# Patient Record
Sex: FEMALE | Race: BLACK OR AFRICAN AMERICAN | URBAN - METROPOLITAN AREA
[De-identification: names, ages, dates, MRNs, and addresses within clinical notes are randomized per-mention and may not be internally consistent; named-entity substitution may affect disease eponyms.]

---

## 2017-01-10 ENCOUNTER — TELEPHONE (OUTPATIENT)
Dept: FAMILY MEDICINE | Facility: CLINIC | Age: 52
End: 2017-01-10

## 2017-01-10 NOTE — TELEPHONE ENCOUNTER
PRIOR AUTHORIZATION FOR   Drug: Tretinoin 0.1% Cream  Insurance: [facility]  ID Number: [id number]  BIN Number: [id number]  N Number: [id number]  Group Number: [id number]  Phone Number: [phone]    Please notify pharmacy if Prior Auth is approved or denied        Thank You  Erika Bhatt CPhT, [place] Pharmacy Technician  [phone]  [email or web address]

## 2017-01-16 NOTE — TELEPHONE ENCOUNTER
PA for tretinoin 0.1% cream denied on 12/1/2016. Still within the appeal period so unable to start a new PA. Please see PA telephone encounter from 7/21/2016 for more information. Message routed to PCP for review.    Abram Amaro, CMA

## 2017-02-14 DIAGNOSIS — J18.9 CAP (COMMUNITY ACQUIRED PNEUMONIA): Primary | ICD-10-CM

## 2017-02-14 DIAGNOSIS — Z00.00 HEALTH CARE MAINTENANCE: ICD-10-CM

## 2017-02-14 RX ORDER — AZITHROMYCIN 250 MG/1
TABLET, FILM COATED ORAL
Qty: 6 TABLET | Refills: 0 | Status: SHIPPED | OUTPATIENT
Start: 2017-02-14 | End: 2017-07-06

## 2017-02-14 RX ORDER — ACETAMINOPHEN 160 MG
1 TABLET,DISINTEGRATING ORAL DAILY
Qty: 30 CAPSULE | Refills: 11 | Status: SHIPPED | OUTPATIENT
Start: 2017-02-14 | End: 2017-09-19

## 2017-02-24 DIAGNOSIS — J01.90 ACUTE SINUSITIS WITH SYMPTOMS > 10 DAYS: Primary | ICD-10-CM

## 2017-06-27 DIAGNOSIS — K21.9 GASTROESOPHAGEAL REFLUX DISEASE WITHOUT ESOPHAGITIS: ICD-10-CM

## 2017-06-27 NOTE — TELEPHONE ENCOUNTER
Omeprazole 20mg Capsules       Last Written Prescription Date: 04/27/16  Last Fill Quantity: 90,  # refills: 3  Last Office Visit with [facility], P or [facility] prescribing provider: 08/18/16    Thank You  Erika Bhatt CPhT, [place] Pharmacy Technician  [phone]  [email or web address]

## 2017-06-28 ENCOUNTER — TELEPHONE (OUTPATIENT)
Dept: FAMILY MEDICINE | Facility: CLINIC | Age: 52
End: 2017-06-28

## 2017-06-28 RX ORDER — NICOTINE POLACRILEX 4 MG/1
20 GUM, CHEWING ORAL DAILY
Qty: 90 TABLET | Refills: 3 | Status: SHIPPED | OUTPATIENT
Start: 2017-06-28 | End: 2017-09-19

## 2017-06-28 NOTE — TELEPHONE ENCOUNTER
PRIOR AUTHORIZATION FOR   Drug: Tretinoin 0.1% Cream  Insurance: [facility]  ID Number: [id number]  BIN Number: [id number]  N Number: [facility]  Group Number: [facility]  Phone Number: [phone]    Please notify pharmacy if Prior Auth is approved or denied          Thank You  Erika Bhatt CPhT, [place] Pharmacy Technician  [phone]  [email or web address]

## 2017-07-06 ENCOUNTER — OFFICE VISIT (OUTPATIENT)
Dept: FAMILY MEDICINE | Facility: CLINIC | Age: 52
End: 2017-07-06

## 2017-07-06 VITALS
DIASTOLIC BLOOD PRESSURE: 81 MMHG | HEART RATE: 79 BPM | TEMPERATURE: 98.6 F | RESPIRATION RATE: 16 BRPM | SYSTOLIC BLOOD PRESSURE: 116 MMHG | OXYGEN SATURATION: 96 %

## 2017-07-06 DIAGNOSIS — Z11.1 TUBERCULOSIS SCREENING: Primary | ICD-10-CM

## 2017-07-06 ASSESSMENT — ENCOUNTER SYMPTOMS
NEUROLOGICAL NEGATIVE: 1
CONSTITUTIONAL NEGATIVE: 1
RESPIRATORY NEGATIVE: 1
CARDIOVASCULAR NEGATIVE: 1

## 2017-07-06 NOTE — PATIENT INSTRUCTIONS
Here is the plan from today's visit    1. Tuberculosis screening    - XR CHEST 1 VW (+PPD OR PREOP); Future          Thank you for coming to [facility] today.  Lab Testing:  **If you had lab testing today and your results are reassuring or normal they will be mailed to you or sent through [employer] within 7 days.   **If the lab tests need quick action we will call you with the results.  The phone number we will call with results is # [phone] (home) . If this is not the best number please call our clinic and change the number.  Medication Refills:  If you need any refills please call your pharmacy and they will contact us.   If you need to  your refill at a new pharmacy, please contact the new pharmacy directly. The new pharmacy will help you get your medications transferred faster.   Scheduling:  If you have any concerns about today's visit or wish to schedule another appointment please call our office during normal business hours [phone] (8-5:00 M-F)  If a referral was made to a [facility] and you don't get a call from central scheduling please call [phone].  If a Mammogram was ordered for you at [facility] call [phone] to schedule or change your appointment.  If you had an XRay/CT/Ultrasound/MRI ordered the number is [phone] to schedule or change your radiology appointment.   Medical Concerns:  If you have urgent medical concerns please call [phone] at any time of the day.  If you have a medical emergency please call [phone].

## 2017-07-06 NOTE — PROGRESS NOTES
HPI:       Sakina Banuelos is a 52 year old who presents for the following  Patient presents with:  Radiology Visit: needs chest x-ray for work- Screening for TB    Patient presents today for chest x-ray that is required for her employment.     Patient reports history of positive PPD, latent tuberculosis.  She reports completing 9 months of [place] for [place] in [place] [facility]  Her employer is requiring an additional chest x-ray for screening.         Problem, Medication and Allergy Lists were reviewed and are current.  Patient is an established patient of this clinic.         Review of Systems:   Review of Systems   Constitutional: Negative.    Respiratory: Negative.    Cardiovascular: Negative.    Neurological: Negative.              Physical Exam:   Patient Vitals for the past 24 hrs:   BP Temp Temp src Pulse Resp SpO2 Weight   07/06/17 0828 116/81 98.6  F (37  C) Oral 79 16 96 % -     There is no height or weight on file to calculate BMI.  Vitals were reviewed and were normal     Physical Exam   Constitutional: She is oriented to person, place, and time. She appears well-nourished. No distress.   Cardiovascular: Normal rate and regular rhythm.    No murmur heard.  Pulmonary/Chest: Effort normal and breath sounds normal. No respiratory distress. She has no wheezes. She has no rales.   Neurological: She is alert and oriented to person, place, and time.   Psychiatric: She has a normal mood and affect.       Assessment and Plan     Sakina was seen today for radiology visit.    Diagnoses and all orders for this visit:    Tuberculosis screening  History of latent tuberculosis - completed 9 month INH regimen in 2002  -     XR CHEST 1 VW (+PPD OR PREOP); Future  -     Will notify patient of official chest xray results once available.      Follow-up here in clinic as needed.        Medications Discontinued During This Encounter   Medication Reason     amoxicillin-clavulanate (AUGMENTIN) 875-125 MG per  tablet      azithromycin (ZITHROMAX) 250 MG tablet      cholecalciferol (VITAMIN D3) 36561 UNITS capsule      cholecalciferol (VITAMIN D) 1000 UNIT tablet      chloroquine (ARALEN) 500 MG tablet      ferrous gluconate (FERGON) 324 (38 FE) MG tablet      loperamide (IMODIUM A-D) 2 MG tablet      triamcinolone (KENALOG) 0.1 % ointment      vitamin D (ERGOCALCIFEROL) 11967 UNIT capsule      loratadine (CLARITIN) 10 MG tablet      Options for treatment and follow-up care were reviewed with the patient. Sakina Banuelos  engaged in the decision making process and verbalized understanding of the options discussed and agreed with the final plan.    Chantel Monroe, KANU CNP

## 2017-07-06 NOTE — MR AVS SNAPSHOT
After Visit Summary   7/6/2017    Sakina Banuelos    MRN: [id number]           Patient Information     Date Of Birth          1965        Visit Information        Provider Department      7/6/2017 8:20 AM Chantel Monroe APRN CNP \[facility]\"" [facility]        Today's Diagnoses     Tuberculosis screening    -  1      Care Instructions    Here is the plan from today's visit    1. Tuberculosis screening    - XR CHEST 1 VW (+PPD OR PREOP); Future          Thank you for coming to [facility] today.  Lab Testing:  **If you had lab testing today and your results are reassuring or normal they will be mailed to you or sent through [employer] within 7 days.   **If the lab tests need quick action we will call you with the results.  The phone number we will call with results is # [phone] (home) . If this is not the best number please call our clinic and change the number.  Medication Refills:  If you need any refills please call your pharmacy and they will contact us.   If you need to  your refill at a new pharmacy, please contact the new pharmacy directly. The new pharmacy will help you get your medications transferred faster.   Scheduling:  If you have any concerns about today's visit or wish to schedule another appointment please call our office during normal business hours [phone] (8-5:00 M-F)  If a referral was made to a [facility] and you don't get a call from central scheduling please call [phone].  If a Mammogram was ordered for you at [facility] call [phone] to schedule or change your appointment.  If you had an XRay/CT/Ultrasound/MRI ordered the number is [phone] to schedule or change your radiology appointment.   Medical Concerns:  If you have urgent medical concerns please call [phone] at any time of the day.  If you have a medical emergency please call [phone].            Follow-ups after your visit        Future tests  that were ordered for you today     Open Future Orders        Priority Expected Expires Ordered    XR CHEST 1 VW (+PPD OR PREOP) Routine 2017            Who to contact     Please call your clinic at [phone] to:    Ask questions about your health    Make or cancel appointments    Discuss your medicines    Learn about your test results    Speak to your doctor   If you have compliments or concerns about an experience at your clinic, or if you wish to file a complaint, please contact [facility] Patient Relations at [phone] or email us at [email or web address]         Additional Information About Your Visit        [employer] Information     [employer] is an electronic gateway that provides easy, online access to your medical records. With [employer], you can request a clinic appointment, read your test results, renew a prescription or communicate with your care team.     To sign up for [employer] visit the website at [email or web address]   You will be asked to enter the access code listed below, as well as some personal information. Please follow the directions to create your username and password.     Your access code is: [email or web address]  Expires: 10/4/2017  8:55 AM     Your access code will  in 90 days. If you need help or a new code, please contact your [facility] or call [phone] for assistance.        Care EveryWhere ID     This is your Care EveryWhere ID. This could be used by other organizations to access your [facility] medical records  [id number]        Your Vitals Were     Pulse Temperature Respirations Last Period Pulse Oximetry       79 98.6  F (37  C) (Oral) 16 2017 96%        Blood Pressure from Last 3 Encounters:   17 116/81   16 119/86   16 128/64    Weight from Last 3 Encounters:   13 231 lb (104.8 kg)   13 229 lb 1.6 oz (103.9 kg)   13 227 lb (103 kg)                  Today's Medication Changes          These changes are accurate as of: 7/6/17  8:55 AM.  If you have any questions, ask your nurse or doctor.               These medicines have changed or have updated prescriptions.        Dose/Directions    vitamin D3 2000 UNITS Caps   This may have changed:  Another medication with the same name was removed. Continue taking this medication, and follow the directions you see here.   Used for:  Health care maintenance   Changed by:  Dariana Willingham MD        Dose:  1 tablet   Take 1 tablet by mouth daily   Quantity:  30 capsule   Refills:  11         Stop taking these medicines if you haven't already. Please contact your care team if you have questions.     amoxicillin-clavulanate 875-125 MG per tablet   Commonly known as:  AUGMENTIN   Stopped by:  Chantel Monroe APRN CNP           azithromycin 250 MG tablet   Commonly known as:  ZITHROMAX   Stopped by:  Chantel Monroe APRN CNP           chloroquine 500 MG tablet   Commonly known as:  ARALEN   Stopped by:  Chantel Monroe APRN CNP           ferrous gluconate 324 (38 FE) MG tablet   Commonly known as:  FERGON   Stopped by:  Chantel Monroe APRN CNP           loperamide 2 MG tablet   Commonly known as:  IMODIUM A-D   Stopped by:  Chantel Monroe APRN CNP           loratadine 10 MG tablet   Commonly known as:  CLARITIN   Stopped by:  Chantel Monroe APRN CNP           triamcinolone 0.1 % ointment   Commonly known as:  KENALOG   Stopped by:  Chantel Monroe APRN CNP           vitamin D 31366 UNIT capsule   Commonly known as:  ERGOCALCIFEROL   Stopped by:  Chantel Monroe APRN CNP                    Primary Care Provider Office Phone # Fax #    Francois Prescott [phone]       [address] [address]        Equal Access to Services     CECILE MCKEON AH: britta Sanders qaybta kaalmada  romaine pruittfernanda croftaaaziza ah. [facility] [phone].    ATENCIÓN: Si kirtila shena, tiene a cortez disposición servicios gratuitos de asistencia lingüística. Jennifer al [phone].    We comply with applicable federal civil rights laws and [place] laws. We do not discriminate on the basis of race, color, national origin, age, disability sex, sexual orientation or gender identity.            Thank you!     Thank you for choosing [facility]  for your care. Our goal is always to provide you with excellent care. Hearing back from our patients is one way we can continue to improve our services. Please take a few minutes to complete the written survey that you may receive in the mail after your visit with us. Thank you!             Your Updated Medication List - Protect others around you: Learn how to safely use, store and throw away your medicines at www.disposemymeds.org.          This list is accurate as of: 7/6/17  8:55 AM.  Always use your most recent med list.                   Brand Name Dispense Instructions for use Diagnosis    albuterol 108 (90 BASE) MCG/ACT Inhaler    albuterol    2 Inhaler    Inhale 1-2 puffs into the lungs every 4 hours as needed for shortness of breath / dyspnea Every 4-6 hours as needed.    History of wheezing       hydroquinone 4 % Crea     56.8 g    Externally apply topically daily as needed    Acne scarring, Hyperpigmentation, Acne vulgaris       omeprazole 20 MG tablet     90 tablet    Take 1 tablet (20 mg) by mouth daily Take 30-60 minutes before a meal.    Gastroesophageal reflux disease without esophagitis       PRENATAL MULTI +DHA 27-0.8-228 MG Caps     120 capsule    Take 1 tablet by mouth 2 times daily    Routine general medical examination at a health care facility       tretinoin 0.1 % cream    RETIN-A    45 g    Apply topically At Bedtime    Acne vulgaris       vitamin D3 2000 UNITS Caps     30 capsule    Take 1 tablet by mouth daily     Health care maintenance

## 2017-09-19 ENCOUNTER — OFFICE VISIT (OUTPATIENT)
Dept: FAMILY MEDICINE | Facility: CLINIC | Age: 52
End: 2017-09-19

## 2017-09-19 VITALS
HEART RATE: 82 BPM | OXYGEN SATURATION: 98 % | RESPIRATION RATE: 16 BRPM | DIASTOLIC BLOOD PRESSURE: 83 MMHG | TEMPERATURE: 98.1 F | SYSTOLIC BLOOD PRESSURE: 122 MMHG | HEIGHT: 65 IN

## 2017-09-19 DIAGNOSIS — M54.5 ACUTE BILATERAL LOW BACK PAIN, WITH SCIATICA PRESENCE UNSPECIFIED: Primary | ICD-10-CM

## 2017-09-19 DIAGNOSIS — K21.9 GASTROESOPHAGEAL REFLUX DISEASE WITHOUT ESOPHAGITIS: ICD-10-CM

## 2017-09-19 DIAGNOSIS — Z00.00 ROUTINE GENERAL MEDICAL EXAMINATION AT A HEALTH CARE FACILITY: ICD-10-CM

## 2017-09-19 DIAGNOSIS — Z00.00 HEALTH CARE MAINTENANCE: ICD-10-CM

## 2017-09-19 DIAGNOSIS — S29.012A RHOMBOID MUSCLE STRAIN, INITIAL ENCOUNTER: ICD-10-CM

## 2017-09-19 LAB
BILIRUBIN UR: NEGATIVE
BLOOD UR: NEGATIVE
GLUCOSE URINE: NEGATIVE
KETONES UR QL: NEGATIVE
LEUKOCYTE ESTERASE UR: NEGATIVE
NITRITE UR QL STRIP: NEGATIVE
PH UR STRIP: 5 [PH] (ref 5–7)
PROTEIN UR: NEGATIVE
SP GR UR STRIP: 1.02
UROBILINOGEN UR STRIP-ACNC: 0.2

## 2017-09-19 RX ORDER — ACETAMINOPHEN 160 MG
1 TABLET,DISINTEGRATING ORAL DAILY
Qty: 30 CAPSULE | Refills: 11 | Status: SHIPPED | OUTPATIENT
Start: 2017-09-19 | End: 2018-07-31

## 2017-09-19 RX ORDER — CHOLECALCIFEROL (VITAMIN D3) 25 MCG
1 TABLET,CHEWABLE ORAL 2 TIMES DAILY
Qty: 120 CAPSULE | Refills: 11 | Status: SHIPPED | OUTPATIENT
Start: 2017-09-19 | End: 2018-03-08

## 2017-09-19 RX ORDER — NICOTINE POLACRILEX 4 MG/1
20 GUM, CHEWING ORAL DAILY
Qty: 90 TABLET | Refills: 3 | Status: SHIPPED | OUTPATIENT
Start: 2017-09-19 | End: 2018-10-26

## 2017-09-19 NOTE — PROGRESS NOTES
FELICIA Presley is a 52 year old  female  who presents:    Chief Complaint   Patient presents with     Pain     Lower stomache pain X1day     1 week of bilateral back pain that radiates into her anterior abdomen. Has more burping. Does not feel bloated.  Not sure if this is muscle.  Has been trying to stretch it but not much better.      ROS: no burning, but when she completes urination it feels a bit odd.  If she drinks more water she has to run more, has trouble holding her urine. At night she is without nocturia, except for if she drinks at [place].  No fevers chills, no vomiting.  Right now she is not constipated - goes daily.    Occasoinally with epigastric pain -  Takes her omeprazole every day in the am - is helping her epigastric pain.      Has ongoing back pain with left sciatic pain.      Also when she sleeps her back hurts under her shoulders.         FHx: mother has DM and she is worried she might have them.      SHx: had surgery to remove a fibroid and since then has had much less bleeding.     Patient Active Problem List   Diagnosis     Health Care Home     Vitamin D deficiency     Screening for malignant neoplasm of cervix     Dyspareunia     Irregular menstrual cycle     Obesity     Vaginal leukorrhea     PCOS (polycystic ovarian syndrome)     S/P myomectomy       Current Outpatient Prescriptions   Medication Sig Dispense Refill     omeprazole 20 MG tablet Take 1 tablet (20 mg) by mouth daily Take 30-60 minutes before a meal. 90 tablet 3     Cholecalciferol (VITAMIN D3) 2000 UNITS CAPS Take 1 tablet by mouth daily 30 capsule 11     albuterol (ALBUTEROL) 108 (90 BASE) MCG/ACT inhaler Inhale 1-2 puffs into the lungs every 4 hours as needed for shortness of breath / dyspnea Every 4-6 hours as needed. 2 Inhaler 4     Prenatal Vit-Fe Fum-FA-Omega (PRENATAL MULTI +DHA) 27-0.8-228 MG CAPS Take 1 tablet by mouth 2 times daily 120 capsule 11     tretinoin (RETIN-A) 0.1 % cream Apply topically At Bedtime 45  "g 3     hydroquinone 4 % CREA Externally apply topically daily as needed 56.8 g 3        No Known Allergies     Results from the last 24 hours  Results for orders placed or performed in visit on 09/19/17 (from the past 24 hour(s))   Urinalysis, Micro If (UA) ([facility])   Result Value Ref Range    Specific Gravity Urine 1.025 1.005 - 1.030    pH Urine 5.0 4.5 - 8.0    Leukocyte Esterase UR negative NEGATIVE    Nitrite Urine negative NEGATIVE    Protein UR negative NEGATIVE    Glucose Urine negative NEGATIVE    Ketones Urine negative NEGATIVE    Urobilinogen mg/dL 0.2 (A) 0.2 E.U./dL    Bilirubin UR Negative NEGATIVE    Blood UR Negative NEGATIVE            Review of Systems:               Physical Exam:     Vitals:    09/19/17 1556   BP: 122/83   Pulse: 82   Resp: 16   Temp: 98.1  F (36.7  C)   TempSrc: Oral   SpO2: 98%   Height: 5' 4.8\" (164.6 cm)     There is no height or weight on file to calculate BMI.  Vitals were reviewed and were normal  GENERAL: healthy, alert, well nourished, well hydrated, no distress  ABDOMEN: soft, no tenderness, no  hepatosplenomegaly, no masses, normal bowel sounds  BACK: bilateral tenderness over SI joints, + Fabere bilaterally  Negative SLR on the left  Nl dorsiflexion strength  Tender over rhomboids bilaterally, left worse than right     Office Visit on 08/18/2016   Component Date Value Ref Range Status     PAP 08/18/2016 NIL   Final     Copath Report 08/18/2016    Final                    Value:  Acc#: [id number]   Signed: 8/22/2016 13:38   MR#: [id number]    SPECIMEN/STAIN PROCESS:  Pap imaged thin layer prep screening (Surepath, FocalPoint with guided  screening)       Pap-Cyto x 1, Reflex HPV if NIL/ASCUS/LSIL x 1    SOURCE: Cervical, endocervical  ----------------------------------------------------------------   Pap imaged thin layer prep screening (Surepath, FocalPoint with guided  screening)  SPECIMEN ADEQUACY:  Satisfactory for evaluation.  -Transformation zone component " present.    CYTOLOGIC INTERPRETATION:    Negative for Intraepithelial Lesion or Malignancy    Electronically signed by:  FLORENCE Rios (ASCP)    CLINICAL HISTORY:    Previous normal pap  Date of Last Pap: 7/22/10,  Papanicolaou Test Limitations:  Cervical cytology is a screening test  with limited sensitivity; regular screening is critical for cancer  prevention; Pap tests are primarily effective for the  diagnosis/prevention of squamous cell carcinoma, not adenocarcinomas or  other cancers.  TESTING                           LAB LOCATION:  [facility], [address] [address], [phone]  Processed and screened at [facility],  [facility]       Hemoglobin A1C 08/18/2016 5.1  4.3 - 6.0 % Final     Cholesterol 08/18/2016 173.9  0.0 - 200.0 mg/dL Final     Cholesterol/HDL Ratio 08/18/2016 2.9  0.0 - 5.0 Final     HDL Cholesterol 08/18/2016 60.5  >40.0 mg/dL Final     LDL Cholesterol Calculated 08/18/2016 104  0 - 129 mg/dL Final     Triglycerides 08/18/2016 46.2  0.0 - 150.0 mg/dL Final     VLDL Cholesterol 08/18/2016 9.2  7.0 - 32.0 mg/dL Final     Vitamin D Deficiency screening 08/18/2016 33  20 - 75 ug/L Final    Comment: Season, race, dietary intake, and treatment affect the concentration of   25-hydroxy-Vitamin D. Values may decrease during winter months and increase   during summer months. Values 20-29 ug/L may indicate Vitamin D insufficiency   and values <20 ug/L may indicate Vitamin D deficiency.   Vitamin D determination is routinely performed by an immunoassay specific for   25 hydroxyvitamin D3.  If an individual is on vitamin D2 (ergocalciferol)   supplementation, please specify 25 OH vitamin D2 and D3 level determination   by   LCMSMS test VITD23.       Specimen Descrip 08/18/2016 Cervical   Final     N Gonorrhea PCR 08/18/2016   NEG Final                    Value:Negative   Negative for N.  gonorrhoeae rRNA by transcription mediated amplification.   A negative result by transcription mediated amplification does not preclude the   presence of N. gonorrhoeae infection because results are dependent on proper   and adequate collection, absence of inhibitors, and sufficient rRNA to be   detected.       Specimen Description 08/18/2016 Cervical   Final     Chlamydia Trachomatis PCR 08/18/2016   NEG Final                    Value:Negative   Negative for C. trachomatis rRNA by transcription mediated amplification.   A negative result by transcription mediated amplification does not preclude the   presence of C. trachomatis infection because results are dependent on proper   and adequate collection, absence of inhibitors, and sufficient rRNA to be   detected.       Yeast Wet Prep 08/18/2016 None   Final     Motile Trichomonas Wet Prep 08/18/2016 Negative   Final     Clue Cells Wet Prep 08/18/2016 None   Final     WBC WET PREP 08/18/2016 None  2 - 5 Final     Bacteria Wet Prep 08/18/2016 Few   Final     pH Wet Prep 08/18/2016 <4.5   Final     Odor Wet Prep 08/18/2016 None   Final     HIV Antigen Antibody Combo 08/18/2016   NR Final                    Value:Nonreactive   HIV-1 p24 Ag & HIV-1/HIV-2 Ab Not Detected       Prolactin 08/18/2016 14  3 - 27 ug/L Final    Reference ranges apply to non-pregnant females only.     TSH 08/18/2016 1.74  0.40 - 4.00 mU/L Final     FSH 08/18/2016 5.0  IU/L Final    Comment: FSH Reference Range   Female: Follicular      2.5-10.2           Mid-cycle       3.4-33.4           Luteal          1.5-9.1           Postmenopausal  23.0-116.3       Dehydroepiandrosterone 08/18/2016 1.650   Final    Comment: Reference range: 0.630 to 4.700  Unit: ng/mL  (Note)  INTERPRETIVE INFORMATION: Dehydroepiandrosterone, Females  18 years and older:   Postmenopausal: 0.60-5.73 ng/mL  REFERENCE INTERVAL: Dehydroepiandrosterone by TMS  Access complete set of age- and/or gender-specific  reference  intervals for this test in the [facility]  Test Directory ([employer]).  Test developed and characteristics determined by [facility]. See Compliance Statement B: [employer]/CS  Performed by [facility],  [address] [facility] [place] [facility] [address] [phone]  www.[employer], Greg Silva MD, Lab. Director       Testosterone Total 08/18/2016 55  8 - 60 ng/dL Final    Comment: This test was developed and its performance characteristics determined by the   [facility],  Special [facility] Laboratory. It has   not been cleared or approved by the FDA. The laboratory is regulated under   CLIA   as qualified to perform high-complexity testing. This test is used for   clinical   purposes. It should not be regarded as investigational or for research.       Sex Hormone Binding Globulin 08/18/2016 95  30 - 135 nmol/L Final     Free Testosterone Calculated 08/18/2016 0.45  0.11 - 0.58 ng/dL Final    Comment: 18-30 Years 0.08-0.74 ng/dL   31-40 Years 0.13-0.92 ng/dL   41-51 Years 0.11-0.58 ng/dL   Postmenopausal 0.06-0.38 ng/dL       Lutropin 08/18/2016 15.9  IU/L Final    Comment: LH Reference Range   Female: Follicular      1.9-12.5           Mid-cycle       8.7-76.3           Luteal          0.5-16.9           Postmenopausal  15.9-54.0       WBC 08/18/2016 4.0  4.0 - 11.0 10e9/L Final     RBC Count 08/18/2016 4.41  3.8 - 5.2 10e12/L Final     Hemoglobin 08/18/2016 9.0* 11.7 - 15.7 g/dL Final     Hematocrit 08/18/2016 31.4* 35.0 - 47.0 % Final     MCV 08/18/2016 71* 78 - 100 fl Final     MCH 08/18/2016 20.4* 26.5 - 33.0 pg Final     MCHC 08/18/2016 28.7* 31.5 - 36.5 g/dL Final     RDW 08/18/2016 21.0* 10.0 - 15.0 % Final     Platelet Count 08/18/2016 203  150 - 450 10e9/L Final     Diff Method 08/18/2016 Automated Method   Final     % Neutrophils 08/18/2016 44.9  % Final     % Lymphocytes 08/18/2016 42.7  % Final     % Monocytes 08/18/2016 9.3  % Final     % Eosinophils 08/18/2016 2.5   % Final     % Basophils 08/18/2016 0.3  % Final     % Immature Granulocytes 08/18/2016 0.3  % Final     Nucleated RBCs 08/18/2016 0  0 /100 Final     Absolute Neutrophil 08/18/2016 1.8  1.6 - 8.3 10e9/L Final     Absolute Lymphocytes 08/18/2016 1.7  0.8 - 5.3 10e9/L Final     Absolute Monocytes 08/18/2016 0.4  0.0 - 1.3 10e9/L Final     Absolute Eosinophils 08/18/2016 0.1  0.0 - 0.7 10e9/L Final     Absolute Basophils 08/18/2016 0.0  0.0 - 0.2 10e9/L Final     Abs Immature Granulocytes 08/18/2016 0.0  0 - 0.4 10e9/L Final     Absolute Nucleated RBC 08/18/2016 0.0   Final     Anti-Mullerian Hormone 08/18/2016 0.056   Final    Comment: Reference range: <=0.064  Unit: ng/mL  (Note)  INTERPRETIVE INFORMATION: Anti-Mullerian Hormone  FEMALE:  6 months - 14 years: 0.256 - 6.345 ng/mL  15-17 years:         0.861 - 10.451 ng/mL  18-29 years:         0.401 - 16.015 ng/mL  30-39 years:         0.176 - 11.705 ng/mL  40-45 years:         6.282 ng/mL or less  46-50 years:         0.064 ng/mL or less  Post-menopausal:     0.003 ng/mL or less  MALE:  6-11 months:         56.677 - 495.299 ng/mL  1-6 years:           33.442 - 342.450 ng/mL  7-9 years:           20.245 - 189.781 ng/mL  10-12 years:         2.903 - 178.243 ng/mL  13 years and older:  2.079 - 30.656 ng/mL  Test developed and characteristics determined by [facility]. See Compliance Statement D: [email or web address]  Performed by [facility],  [address] [place] [address] [phone]  www.[employer], Greg Silva MD, Lab. Director       HPV 16 DNA 08/18/2016 Negative  NEG Final     HPV 18 DNA 08/18/2016 Negative  NEG Final     Other HR HPV 08/18/2016 Negative  NEG Final     Final Diagnosis 08/18/2016    Final                    Value:This patient's sample is negative for HPV DNA.   The American College of   Obstetricians and Gynecologists (ACOG) recommends any woman between 30-65 years   old who receives negative test results on both Pap cytology  screening and HPV   DNA testing should be rescreened in 5 years.  (Note)  METHODOLOGY:  The Roche wei 4800 system uses automated extraction,  simultaneous amplification of HPV (L1 region) and beta-globin,  followed by  real time detection of fluorescent labeled HPV and beta  globin using specific oligonucleotide probes . The test specifically  identifies types HPV 16 DNA and HPV 18 DNA while concurrently  detecting the rest of the high risk types (31, 33, 35, 39, 45, 51,  52, 56, 58, 59, 66 or 68).    COMMENTS:  This test is not intended for use as a screening device  for women under age 30 with normal cervical cytology.  Results should  be correlated with cytologic and histologic findings. Close clinical  followup is recommended.    This test was developed and its perfo                          rmance characteristics  determined by the [facility], Molecular  Diagnostics Laboratory. It has not been cleared or approved by the  FDA. The laboratory is regulated under CLIA as qualified to perform  high-complexity testing. This test is used for clinical purposes. It  should not be regarded as investigational or for research.         Specimen Description 08/18/2016    Final                    Value:Cervical Cells  [id number]           Assessment and Plan     Sakina was seen today for pain.    Diagnoses and all orders for this visit:    Acute bilateral low back pain, with sciatica presence unspecified - likley SI dysfunction bilaterally. Back pain primarily musculoskeletal. Discussion about needing to do regular exercise, and will try PT to help. Showed exercises on the web to use.   -     Cancel: Urinalysis, Micro If ([facility]); Future  -     PHYSICAL THERAPY REFERRAL - EXTERNAL  -     Urinalysis, Micro If (UA) ([facility])    Gastroesophageal reflux disease without esophagitis  -     omeprazole 20 MG tablet; Take 1 tablet (20 mg) by mouth daily Take 30-60 minutes before a meal.    Routine general  medical examination at a health care facility  -     Prenatal Vit-Fe Fum-FA-Omega (PRENATAL MULTI +DHA) 27-0.8-228 MG CAPS; Take 1 tablet by mouth 2 times daily    Health care maintenance  -     Cholecalciferol (VITAMIN D3) 2000 UNITS CAPS; Take 1 tablet by mouth daily    Rhomboid muscle strain, initial encounter  -     PHYSICAL THERAPY REFERRAL - EXTERNAL        Medications Discontinued During This Encounter   Medication Reason     omeprazole 20 MG tablet Reorder     Prenatal Vit-Fe Fum-FA-Omega (PRENATAL MULTI +DHA) 27-0.8-228 MG CAPS Reorder     Cholecalciferol (VITAMIN D3) 2000 UNITS CAPS Reorder       Options for treatment and follow-up care were reviewed with the patient . Sakina Banuelos  engaged in the decision making process and verbalized understanding of the options discussed and agreed with the final plan.  Total time -25 min with more than half spent counseling on exercises.  Mary Coronado MD

## 2017-09-19 NOTE — MR AVS SNAPSHOT
After Visit Summary   9/19/2017    Sakina Banuelos    MRN: [id number]           Patient Information     Date Of Birth          1965        Visit Information        Provider Department      9/19/2017 3:40 PM Mary Coronado MD [facility]        Today's Diagnoses     Acute bilateral low back pain, with sciatica presence unspecified    -  1    Gastroesophageal reflux disease without esophagitis        Routine general medical examination at a health care facility        Health care maintenance        Rhomboid muscle strain, initial encounter          Care Instructions    Here is the plan from today's visit    1. Gastroesophageal reflux disease without esophagitis  - omeprazole 20 MG tablet; Take 1 tablet (20 mg) by mouth daily Take 30-60 minutes before a meal.  Dispense: 90 tablet; Refill: 3    2. Routine general medical examination at a health care facility  - Prenatal Vit-Fe Fum-FA-Omega (PRENATAL MULTI +DHA) 27-0.8-228 MG CAPS; Take 1 tablet by mouth 2 times daily  Dispense: 120 capsule; Refill: 11    3. Health care maintenance  - Cholecalciferol (VITAMIN D3) 2000 UNITS CAPS; Take 1 tablet by mouth daily  Dispense: 30 capsule; Refill: 11    4. Acute bilateral- Sacroiliac Pain bilaterally (You Tube for stretches and strengthening)    - Urinalysis, Micro If ([facility]); Future  - PHYSICAL THERAPY REFERRAL - EXTERNAL    5. Rhomboid muscle strain, initial encounter  - PHYSICAL THERAPY REFERRAL - EXTERNAL          Please call or return to clinic if your symptoms don't go away.    Follow up plan      Thank you for coming to [facility] today.  Lab Testing:  **If you had lab testing today and your results are reassuring or normal they will be mailed to you or sent through [employer] within 7 days.   **If the lab tests need quick action we will call you with the results.  The phone number we will call with results is # [phone] (home) . If this is not the best number please call our  clinic and change the number.  Medication Refills:  If you need any refills please call your pharmacy and they will contact us.   If you need to  your refill at a new pharmacy, please contact the new pharmacy directly. The new pharmacy will help you get your medications transferred faster.   Scheduling:  If you have any concerns about today's visit or wish to schedule another appointment please call our office during normal business hours [phone] (8-5:00 M-F)  If a referral was made to a [facility] and you don't get a call from central scheduling please call [phone].  If a Mammogram was ordered for you at [facility] call [phone] to schedule or change your appointment.  If you had an XRay/CT/Ultrasound/MRI ordered the number is [phone] to schedule or change your radiology appointment.   Medical Concerns:  If you have urgent medical concerns please call [phone] at any time of the day.  If you have a medical emergency please call 911.          Follow-ups after your visit        Additional Services     PHYSICAL THERAPY REFERRAL - EXTERNAL       [facility]   [address] ,     Evaluate and treat         At this location [facility]            PHYSICAL THERAPY REFERRAL - EXTERNAL       [facility]  [address]       At this location [facility]                  Future tests that were ordered for you today     Open Future Orders        Priority Expected Expires Ordered    Urinalysis, Micro If ([facility]) Routine  9/19/2018 9/19/2017            Who to contact     Please call your clinic at [phone] to:    Ask questions about your health    Make or cancel appointments    Discuss your medicines    Learn about your test results    Speak to your doctor   If you have compliments or concerns about an experience at your clinic, or if you wish to file a complaint, please contact [facility] Patient Relations at [phone] or email us at  [email or web address]         Additional Information About Your Visit        [employer] Information     [employer] is an electronic gateway that provides easy, online access to your medical records. With [employer], you can request a clinic appointment, read your test results, renew a prescription or communicate with your care team.     To sign up for [employer] visit the website at [email or web address]   You will be asked to enter the access code listed below, as well as some personal information. Please follow the directions to create your username and password.     Your access code is: [email or web address]  Expires: 10/4/2017  8:55 AM     Your access code will  in 90 days. If you need help or a new code, please contact your [facility] or call [phone] for assistance.        Care EveryWhere ID     This is your Care EveryWhere ID. This could be used by other organizations to access your [facility] medical records  [id number]        Your Vitals Were     Pulse Temperature Respirations Height Pulse Oximetry Breastfeeding?    82 98.1  F (36.7  C) (Oral) 16 5' 4.8\" (164.6 cm) 98% No       Blood Pressure from Last 3 Encounters:   17 122/83   17 116/81   16 119/86    Weight from Last 3 Encounters:   13 231 lb (104.8 kg)   13 229 lb 1.6 oz (103.9 kg)   13 227 lb (103 kg)              We Performed the Following     PHYSICAL THERAPY REFERRAL - EXTERNAL     PHYSICAL THERAPY REFERRAL - EXTERNAL          Where to get your medicines      These medications were sent to [facility] [place], [place] - [address] [facility] [address]     Phone:  [phone]     omeprazole 20 MG tablet    PRENATAL MULTI +DHA 27-0.8-228 MG Caps    vitamin D3 2000 UNITS Caps          Primary Care Provider Office Phone # Fax #    Rohan Polanco [phone]       [address] [facility] [address]        Equal Access to " Services     [facility]: Hadii aad ku hadmemobony Lenniemessi, waaxda luqadaha, qaybta kaalmada sonal, romaine de la garza . So [facility] [phone].    ATENCIÓN: Si kirtila shena, tiene a cortez disposición servicios gratuitos de asistencia lingüística. Llame al [phone].    We comply with applicable federal civil rights laws and [place] laws. We do not discriminate on the basis of race, color, national origin, age, disability sex, sexual orientation or gender identity.            Thank you!     Thank you for choosing [facility]  for your care. Our goal is always to provide you with excellent care. Hearing back from our patients is one way we can continue to improve our services. Please take a few minutes to complete the written survey that you may receive in the mail after your visit with us. Thank you!             Your Updated Medication List - Protect others around you: Learn how to safely use, store and throw away your medicines at www.disposemymeds.org.          This list is accurate as of: 9/19/17  4:20 PM.  Always use your most recent med list.                   Brand Name Dispense Instructions for use Diagnosis    albuterol 108 (90 BASE) MCG/ACT Inhaler    PROAIR HFA    2 Inhaler    Inhale 1-2 puffs into the lungs every 4 hours as needed for shortness of breath / dyspnea Every 4-6 hours as needed.    History of wheezing       hydroquinone 4 % Crea     56.8 g    Externally apply topically daily as needed    Acne scarring, Hyperpigmentation, Acne vulgaris       omeprazole 20 MG tablet     90 tablet    Take 1 tablet (20 mg) by mouth daily Take 30-60 minutes before a meal.    Gastroesophageal reflux disease without esophagitis       PRENATAL MULTI +DHA 27-0.8-228 MG Caps     120 capsule    Take 1 tablet by mouth 2 times daily    Routine general medical examination at a health care facility       tretinoin 0.1 % cream    RETIN-A    45 g    Apply topically At Bedtime     Acne vulgaris       vitamin D3 2000 UNITS Caps     30 capsule    Take 1 tablet by mouth daily    Health care maintenance

## 2017-09-19 NOTE — LETTER
September 19, 2017      Sakina Banuelos  [address]  [address]  [place] [address]        Dear Sakina,    Thank you for getting your care at [facility]. Please see below for your test results.  Your urine looks fine!    Resulted Orders   Urinalysis, Micro If (UA) ([facility])   Result Value Ref Range    Specific Gravity Urine 1.025 1.005 - 1.030    pH Urine 5.0 4.5 - 8.0    Leukocyte Esterase UR negative NEGATIVE    Nitrite Urine negative NEGATIVE    Protein UR negative NEGATIVE    Glucose Urine negative NEGATIVE    Ketones Urine negative NEGATIVE    Urobilinogen mg/dL 0.2 (A) 0.2 E.U./dL    Bilirubin UR Negative NEGATIVE    Blood UR Negative NEGATIVE       If you have any concerns about these results please call and leave a message for me or send a Novinda message to the clinic.    Sincerely,    Mary Coronado MD

## 2017-09-19 NOTE — PATIENT INSTRUCTIONS
Here is the plan from today's visit    1. Gastroesophageal reflux disease without esophagitis  - omeprazole 20 MG tablet; Take 1 tablet (20 mg) by mouth daily Take 30-60 minutes before a meal.  Dispense: 90 tablet; Refill: 3    2. Routine general medical examination at a health care facility  - Prenatal Vit-Fe Fum-FA-Omega (PRENATAL MULTI +DHA) 27-0.8-228 MG CAPS; Take 1 tablet by mouth 2 times daily  Dispense: 120 capsule; Refill: 11    3. Health care maintenance  - Cholecalciferol (VITAMIN D3) 2000 UNITS CAPS; Take 1 tablet by mouth daily  Dispense: 30 capsule; Refill: 11    4. Acute bilateral- Sacroiliac Pain bilaterally (You Tube for stretches and strengthening)    - Urinalysis, Micro If ([facility]); Future  - PHYSICAL THERAPY REFERRAL - EXTERNAL    5. Rhomboid muscle strain, initial encounter  - PHYSICAL THERAPY REFERRAL - EXTERNAL          Please call or return to clinic if your symptoms don't go away.    Follow up plan      Thank you for coming to [facility] today.  Lab Testing:  **If you had lab testing today and your results are reassuring or normal they will be mailed to you or sent through [facility] within 7 days.   **If the lab tests need quick action we will call you with the results.  The phone number we will call with results is # [phone] (home) . If this is not the best number please call our clinic and change the number.  Medication Refills:  If you need any refills please call your pharmacy and they will contact us.   If you need to  your refill at a new pharmacy, please contact the new pharmacy directly. The new pharmacy will help you get your medications transferred faster.   Scheduling:  If you have any concerns about today's visit or wish to schedule another appointment please call our office during normal business hours [phone] (8-5:00 M-F)  If a referral was made to a [facility] and you don't get a call from central scheduling please call  [phone].  If a Mammogram was ordered for you at [facility] call [phone] to schedule or change your appointment.  If you had an XRay/CT/Ultrasound/MRI ordered the number is [phone] to schedule or change your radiology appointment.   Medical Concerns:  If you have urgent medical concerns please call [phone] at any time of the day.  If you have a medical emergency please call [phone].

## 2017-09-19 NOTE — NURSING NOTE
I have recommended that this patient have a mammogram, Fitt and flu shot but she declines at this time. Netta Bueno, CMA

## 2018-03-06 ENCOUNTER — OFFICE VISIT (OUTPATIENT)
Dept: FAMILY MEDICINE | Facility: CLINIC | Age: 53
End: 2018-03-06

## 2018-03-06 VITALS
DIASTOLIC BLOOD PRESSURE: 79 MMHG | RESPIRATION RATE: 20 BRPM | OXYGEN SATURATION: 99 % | TEMPERATURE: 98 F | HEART RATE: 75 BPM | SYSTOLIC BLOOD PRESSURE: 121 MMHG

## 2018-03-06 DIAGNOSIS — Z00.00 HEALTHCARE MAINTENANCE: ICD-10-CM

## 2018-03-06 DIAGNOSIS — R42 VERTIGO: Primary | ICD-10-CM

## 2018-03-06 LAB
BUN SERPL-MCNC: 7.1 MG/DL (ref 7–19)
CALCIUM SERPL-MCNC: 9 MG/DL (ref 8.5–10.1)
CHLORIDE SERPLBLD-SCNC: 99.7 MMOL/L (ref 98–110)
CO2 SERPL-SCNC: 26 MMOL/L (ref 20–32)
CREAT SERPL-MCNC: 0.5 MG/DL (ref 0.5–1)
FERRITIN SERPL-MCNC: 5 NG/ML (ref 8–252)
GFR SERPL CREATININE-BSD FRML MDRD: >90 ML/MIN/1.7 M2
GLUCOSE SERPL-MCNC: 94.4 MG'DL (ref 70–99)
HCT VFR BLD AUTO: 33.4 % (ref 35–47)
HEMOGLOBIN: 10.2 G/DL (ref 11.7–15.7)
MCH RBC QN AUTO: 23.4 PG (ref 26.5–35)
MCHC RBC AUTO-ENTMCNC: 30.5 G/DL (ref 32–36)
MCV RBC AUTO: 76.8 FL (ref 78–100)
PLATELET # BLD AUTO: 219 K/UL (ref 150–450)
POTASSIUM SERPL-SCNC: 3.8 MMOL/DL (ref 3.3–4.5)
RBC # BLD AUTO: 4.35 M/UL (ref 3.8–5.2)
SODIUM SERPL-SCNC: 136.6 MMOL/L (ref 132.6–141.4)
TSH SERPL DL<=0.005 MIU/L-ACNC: 1.72 MU/L (ref 0.4–4)
WBC # BLD AUTO: 5.4 K/UL (ref 4–11)

## 2018-03-06 RX ORDER — MECLIZINE HYDROCHLORIDE 25 MG/1
25 TABLET ORAL EVERY 6 HOURS PRN
Qty: 30 TABLET | Refills: 0 | Status: SHIPPED | OUTPATIENT
Start: 2018-03-06 | End: 2018-12-20

## 2018-03-06 ASSESSMENT — ENCOUNTER SYMPTOMS
CHILLS: 0
FEVER: 0
RESPIRATORY NEGATIVE: 1
DIARRHEA: 0
WEAKNESS: 0
HEADACHES: 0
CARDIOVASCULAR NEGATIVE: 1
NAUSEA: 1
LIGHT-HEADEDNESS: 0
FATIGUE: 1
VOMITING: 0
DIZZINESS: 1
EYES NEGATIVE: 1
ABDOMINAL PAIN: 0

## 2018-03-06 NOTE — MR AVS SNAPSHOT
After Visit Summary   3/6/2018    Sakina Banuelos    MRN: [id number]           Patient Information     Date Of Birth          1965        Visit Information        Provider Department      3/6/2018 2:20 PM Chantel Monroe APRN CNP [facility]        Today's Diagnoses     Vertigo    -  1       Follow-ups after your visit        Who to contact     Please call your clinic at [phone] to:    Ask questions about your health    Make or cancel appointments    Discuss your medicines    Learn about your test results    Speak to your doctor            Additional Information About Your Visit        MyChart Information     [employer] is an electronic gateway that provides easy, online access to your medical records. With [employer], you can request a clinic appointment, read your test results, renew a prescription or communicate with your care team.     To sign up for [employer] visit the website at [email or web address]   You will be asked to enter the access code listed below, as well as some personal information. Please follow the directions to create your username and password.     Your access code is: [email or web address]  Expires: 2018  2:34 PM     Your access code will  in 90 days. If you need help or a new code, please contact your [facility] or call [phone] for assistance.        Care EveryWhere ID     This is your Care EveryWhere ID. This could be used by other organizations to access your [place] medical records  [id number]        Your Vitals Were     Pulse Temperature Respirations Last Period Pulse Oximetry Breastfeeding?    75 98  F (36.7  C) (Oral) 20 2018 99% No       Blood Pressure from Last 3 Encounters:   18 121/79   17 122/83   17 116/81    Weight from Last 3 Encounters:   13 231 lb (104.8 kg)   13 229 lb 1.6 oz (103.9 kg)   13 227 lb (103 kg)              We Performed the Following      Basic Metabolic Panel ([facility])     CBC with Plt ([facility])     TSH with free T4 reflex          Today's Medication Changes          These changes are accurate as of 3/6/18  2:34 PM.  If you have any questions, ask your nurse or doctor.               Start taking these medicines.        Dose/Directions    meclizine 25 MG tablet   Commonly known as:  ANTIVERT   Used for:  Vertigo   Started by:  Chantel Monroe APRN CNP        Dose:  25 mg   Take 1 tablet (25 mg) by mouth every 6 hours as needed for dizziness   Quantity:  30 tablet   Refills:  0            Where to get your medicines      These medications were sent to [facility] [place], [place] - [address] [facility] [address]     Phone:  [phone]     meclizine 25 MG tablet                Primary Care Provider Office Phone # Fax #    Rohan Polanco [phone]       [address] [facility] [address]        Equal Access to Services     CECILE MCKEON : Hadii juan szymanski Somessi, waaxda lualexadaha, qaybta kaalmada adeluis a, romaine de la garza . So [facility] [phone].    ATENCIÓN: Si habla español, tiene a cortez disposición servicios gratuitos de asistencia lingüística. [facility] [phone].    We comply with applicable federal civil rights laws and [place] laws. We do not discriminate on the basis of race, color, national origin, age, disability, sex, sexual orientation, or gender identity.            Thank you!     Thank you for choosing [facility]  for your care. Our goal is always to provide you with excellent care. Hearing back from our patients is one way we can continue to improve our services. Please take a few minutes to complete the written survey that you may receive in the mail after your visit with us. Thank you!             Your Updated Medication List - Protect others around you: Learn how to safely use, store and throw away your medicines at  www.disposemymeds.org.          This list is accurate as of 3/6/18  2:34 PM.  Always use your most recent med list.                   Brand Name Dispense Instructions for use Diagnosis    albuterol 108 (90 BASE) MCG/ACT Inhaler    PROAIR HFA    2 Inhaler    Inhale 1-2 puffs into the lungs every 4 hours as needed for shortness of breath / dyspnea Every 4-6 hours as needed.    History of wheezing       hydroquinone 4 % Crea     56.8 g    Externally apply topically daily as needed    Acne scarring, Hyperpigmentation, Acne vulgaris       meclizine 25 MG tablet    ANTIVERT    30 tablet    Take 1 tablet (25 mg) by mouth every 6 hours as needed for dizziness    Vertigo       omeprazole 20 MG tablet     90 tablet    Take 1 tablet (20 mg) by mouth daily Take 30-60 minutes before a meal.    Gastroesophageal reflux disease without esophagitis       PRENATAL MULTI +DHA 27-0.8-228 MG Caps     120 capsule    Take 1 tablet by mouth 2 times daily    Routine general medical examination at a health care facility       tretinoin 0.1 % cream    RETIN-A    45 g    Apply topically At Bedtime    Acne vulgaris       vitamin D3 2000 UNITS Caps     30 capsule    Take 1 tablet by mouth daily    Health care maintenance

## 2018-03-08 PROBLEM — D50.8 IRON DEFICIENCY ANEMIA SECONDARY TO INADEQUATE DIETARY IRON INTAKE: Status: ACTIVE | Noted: 2018-03-08

## 2018-03-08 RX ORDER — CHOLECALCIFEROL (VITAMIN D3) 25 MCG
1 TABLET,CHEWABLE ORAL DAILY
Qty: 120 CAPSULE | Refills: 11 | Status: SHIPPED | OUTPATIENT
Start: 2018-03-08 | End: 2018-05-30

## 2018-05-08 ENCOUNTER — TELEPHONE (OUTPATIENT)
Dept: FAMILY MEDICINE | Facility: CLINIC | Age: 53
End: 2018-05-08

## 2018-05-08 DIAGNOSIS — Z00.00 HEALTHCARE MAINTENANCE: Primary | ICD-10-CM

## 2018-05-08 NOTE — TELEPHONE ENCOUNTER
Central Prior Authorization Team   Phone: [phone]    PA Initiation    Medication: PA for prenatal-Initiated-  Insurance Company: [employer] - Phone [phone] Fax [phone]  Pharmacy Filling the Rx: [facility] [place], [place] - [address]  Filling Pharmacy Phone: [phone]  Filling Pharmacy Fax:    Start Date: 5/8/2018

## 2018-05-08 NOTE — TELEPHONE ENCOUNTER
PRIOR AUTHORIZATION FOR   Drug: Prenatal multi+DHA+ 27-0.8-228 capsule  Insurance: [facility]  ID Number: [id number]  BIN Number: [id number]   PCN Number: [place]  Group Number: [id number]  Phone Number: [phone]    Please notify pharmacy if Prior Auth is approved or denied

## 2018-05-25 NOTE — TELEPHONE ENCOUNTER
PRIOR AUTHORIZATION DENIED    Medication: PA for prenatal-Initiated-Denied-    Denial Date: 5/9/2018      Denial Rational: Waiting for denial fax to be resent from insurance.     Appeal Information: Waiting for denial fax to be resent from insurance.

## 2018-05-30 RX ORDER — MULTIPLE VITAMINS W/ MINERALS TAB 9MG-400MCG
1 TAB ORAL DAILY
Qty: 100 TABLET | Refills: 3 | Status: SHIPPED | OUTPATIENT
Start: 2018-05-30 | End: 2019-11-15

## 2018-05-30 NOTE — TELEPHONE ENCOUNTER
Patient may purchase OTC prenatal vitamin or get prescription for regular multivitamin (I sent that over for patient). - Juan R CNP

## 2018-05-30 NOTE — TELEPHONE ENCOUNTER
I have never seen this patient.  Chantel wrote for prenatal vitamin in March 2018.  I spoke with Chantel, and patient can use over the counter prenatal vitamin with iron.

## 2018-05-30 NOTE — TELEPHONE ENCOUNTER
Prior Authorization:     Denied Reason: see below    Alternatives: None available    Pharmacy notified.  Routing to MD    Please advise if you would like to move forward with the appeal process or plan to  prescribe an alternative medication. If Appeal is desired a letter of medical necessity with denial rationale is needed to start the appeal process.   Route to PA Pool when completed.    KATIE Bullock 9:48 AM May 30, 2018

## 2018-07-31 ENCOUNTER — RADIANT APPOINTMENT (OUTPATIENT)
Dept: GENERAL RADIOLOGY | Facility: CLINIC | Age: 53
End: 2018-07-31
Attending: FAMILY MEDICINE
Payer: COMMERCIAL

## 2018-07-31 ENCOUNTER — OFFICE VISIT (OUTPATIENT)
Dept: FAMILY MEDICINE | Facility: CLINIC | Age: 53
End: 2018-07-31
Payer: COMMERCIAL

## 2018-07-31 VITALS
HEART RATE: 74 BPM | SYSTOLIC BLOOD PRESSURE: 134 MMHG | OXYGEN SATURATION: 100 % | DIASTOLIC BLOOD PRESSURE: 83 MMHG | TEMPERATURE: 98.5 F

## 2018-07-31 DIAGNOSIS — Z00.00 HEALTH CARE MAINTENANCE: ICD-10-CM

## 2018-07-31 DIAGNOSIS — G89.29 CHRONIC LEFT SHOULDER PAIN: Primary | ICD-10-CM

## 2018-07-31 DIAGNOSIS — M25.512 CHRONIC LEFT SHOULDER PAIN: Primary | ICD-10-CM

## 2018-07-31 DIAGNOSIS — Z13.9 SCREENING FOR CONDITION: ICD-10-CM

## 2018-07-31 RX ORDER — CYCLOBENZAPRINE HCL 5 MG
5 TABLET ORAL 3 TIMES DAILY PRN
Qty: 42 TABLET | Refills: 0 | Status: SHIPPED | OUTPATIENT
Start: 2018-07-31 | End: 2019-11-15

## 2018-07-31 RX ORDER — ACETAMINOPHEN 160 MG
1 TABLET,DISINTEGRATING ORAL DAILY
Qty: 30 CAPSULE | Refills: 11 | Status: SHIPPED | OUTPATIENT
Start: 2018-07-31 | End: 2019-04-20

## 2018-07-31 RX ORDER — ACETAMINOPHEN 325 MG/1
650 TABLET ORAL EVERY 6 HOURS PRN
Qty: 100 TABLET | Refills: 0 | Status: SHIPPED | OUTPATIENT
Start: 2018-07-31 | End: 2019-05-29

## 2018-07-31 NOTE — PROGRESS NOTES
Preceptor Attestation:   Patient seen, evaluated and discussed with the resident. I personally reviewed the imaging and agree with the interpretation documented by the resident. I have verified the content of the note, which accurately reflects my assessment of the patient and the plan of care.   Supervising Physician:  Rohan Polanco MD

## 2018-07-31 NOTE — MR AVS SNAPSHOT
After Visit Summary   7/31/2018    Sakina Banuelos    MRN: [id number]           Patient Information     Date Of Birth          1965        Visit Information        Provider Department      7/31/2018 9:20 AM Chanell Chaidez MD [facility]        Today's Diagnoses     Screening for condition    -  1    Chronic left shoulder pain        Health care maintenance          Care Instructions    Here is the plan from today's visit    1. Screening for condition  - X-ray lt Shoulder G/E 3 vw; Future    2. Chronic left shoulder pain  - acetaminophen (TYLENOL) 325 MG tablet; Take 2 tablets (650 mg) by mouth every 6 hours as needed for mild pain  Dispense: 100 tablet; Refill: 0  - cyclobenzaprine (FLEXERIL) 5 MG tablet; Take 1 tablet (5 mg) by mouth 3 times daily as needed for muscle spasms  Dispense: 42 tablet; Refill: 0    3. Health care maintenance  - Cholecalciferol (VITAMIN D3) 2000 units CAPS; Take 1 tablet by mouth daily  Dispense: 30 capsule; Refill: 11      Please call or return to clinic if your symptoms don't go away.    Follow up plan  Please make a clinic appointment for follow up with your primary physician Physician No Ref-Primary as needed.      Thank you for coming to [facility] today.  Lab Testing:  **If you had lab testing today and your results are reassuring or normal they will be mailed to you or sent through [employer] within 7 days.   **If the lab tests need quick action we will call you with the results.  The phone number we will call with results is # [phone] (home) . If this is not the best number please call our clinic and change the number.  Medication Refills:  If you need any refills please call your pharmacy and they will contact us.   If you need to  your refill at a new pharmacy, please contact the new pharmacy directly. The new pharmacy will help you get your medications transferred faster.   Scheduling:  If you have any concerns about today's  visit or wish to schedule another appointment please call our office during normal business hours [phone] (8-5:00 M-F)  If a referral was made to a [facility] and you don't get a call from central scheduling please call [phone].  If a Mammogram was ordered for you at [facility] call [phone] to schedule or change your appointment.  If you had an XRay/CT/Ultrasound/MRI ordered the number is [phone] to schedule or change your radiology appointment.   Medical Concerns:  If you have urgent medical concerns please call [phone] at any time of the day.    Chanell Chaidez MD            Follow-ups after your visit        Who to contact     Please call your clinic at [phone] to:    Ask questions about your health    Make or cancel appointments    Discuss your medicines    Learn about your test results    Speak to your doctor            Additional Information About Your Visit        Care EveryWhere ID     This is your Care EveryWhere ID. This could be used by other organizations to access your [place] medical records  [id number]        Your Vitals Were     Pulse Temperature Last Period Pulse Oximetry Breastfeeding?       74 98.5  F (36.9  C) (Oral) 06/25/2018 100% No        Blood Pressure from Last 3 Encounters:   07/31/18 134/83   03/06/18 121/79   09/19/17 122/83    Weight from Last 3 Encounters:   01/25/13 231 lb (104.8 kg)   01/14/13 229 lb 1.6 oz (103.9 kg)   01/09/13 227 lb (103 kg)                 Today's Medication Changes          These changes are accurate as of 7/31/18 10:11 AM.  If you have any questions, ask your nurse or doctor.               Start taking these medicines.        Dose/Directions    acetaminophen 325 MG tablet   Commonly known as:  TYLENOL   Used for:  Chronic left shoulder pain   Started by:  Chanell Chaidez MD        Dose:  650 mg   Take 2 tablets (650 mg) by mouth every 6 hours as needed for mild pain   Quantity:  100 tablet    Refills:  0       cyclobenzaprine 5 MG tablet   Commonly known as:  FLEXERIL   Used for:  Chronic left shoulder pain   Started by:  Chanell Chaidez MD        Dose:  5 mg   Take 1 tablet (5 mg) by mouth 3 times daily as needed for muscle spasms   Quantity:  42 tablet   Refills:  0            Where to get your medicines      These medications were sent to [facility] [place], [place] - [address] [facility] [address]     Phone:  [phone]     acetaminophen 325 MG tablet    cyclobenzaprine 5 MG tablet    vitamin D3 2000 units [facility]                Primary Care Provider Fax #    Physician No Ref-Primary [phone]       No address on file        Equal Access to Services     CECILE MCKEON : Raúl Raza, warory rosado, qayolanda carbajalalmada sonal, romaine bhandari. So [facility] [phone].    ATENCIÓN: Si habla español, tiene a cortez disposición servicios gratuitos de asistencia lingüística. Llame al [phone].    We comply with applicable federal civil rights laws and [place] laws. We do not discriminate on the basis of race, color, national origin, age, disability, sex, sexual orientation, or gender identity.            Thank you!     Thank you for choosing [facility]  for your care. Our goal is always to provide you with excellent care. Hearing back from our patients is one way we can continue to improve our services. Please take a few minutes to complete the written survey that you may receive in the mail after your visit with us. Thank you!             Your Updated Medication List - Protect others around you: Learn how to safely use, store and throw away your medicines at www.disposemymeds.org.          This list is accurate as of 7/31/18 10:11 AM.  Always use your most recent med list.                   Brand Name Dispense Instructions for use Diagnosis    acetaminophen 325 MG tablet    TYLENOL    100 tablet     Take 2 tablets (650 mg) by mouth every 6 hours as needed for mild pain    Chronic left shoulder pain       albuterol 108 (90 Base) MCG/ACT Inhaler    PROAIR HFA    2 Inhaler    Inhale 1-2 puffs into the lungs every 4 hours as needed for shortness of breath / dyspnea Every 4-6 hours as needed.    History of wheezing       cyclobenzaprine 5 MG tablet    FLEXERIL    42 tablet    Take 1 tablet (5 mg) by mouth 3 times daily as needed for muscle spasms    Chronic left shoulder pain       hydroquinone 4 % Crea     56.8 g    Externally apply topically daily as needed    Acne scarring, Hyperpigmentation, Acne vulgaris       meclizine 25 MG tablet    ANTIVERT    30 tablet    Take 1 tablet (25 mg) by mouth every 6 hours as needed for dizziness    Vertigo       multivitamin, therapeutic with minerals Tabs tablet     100 tablet    Take 1 tablet by mouth daily    Healthcare maintenance       omeprazole 20 MG tablet     90 tablet    Take 1 tablet (20 mg) by mouth daily Take 30-60 minutes before a meal.    Gastroesophageal reflux disease without esophagitis       tretinoin 0.1 % cream    RETIN-A    45 g    Apply topically At Bedtime    Acne vulgaris       vitamin D3 2000 units Caps     30 capsule    Take 1 tablet by mouth daily    Health care maintenance

## 2018-07-31 NOTE — PATIENT INSTRUCTIONS
Here is the plan from today's visit    1. Chronic left shoulder pain  - X-ray lt Shoulder G/E 3 vw; Future - No evidence of significant arthritic changes or fracture.   - Physical therapy - [facility] for ROM of left shoulder  - acetaminophen (TYLENOL) 325 MG tablet; Take 2 tablets (650 mg) by mouth every 6 hours as needed for mild pain  Dispense: 100 tablet; Refill: 0  - cyclobenzaprine (FLEXERIL) 5 MG tablet; Take 1 tablet (5 mg) by mouth 3 times daily as needed for muscle spasms  Dispense: 42 tablet; Refill: 0    2. Health care maintenance  - Cholecalciferol (VITAMIN D3) 2000 units CAPS; Take 1 tablet by mouth daily  Dispense: 30 capsule; Refill: 11      Please call or return to clinic if your symptoms don't go away.    Follow up plan  Please make a clinic appointment for follow up with your primary physician Physician No Ref-Primary as needed.      Thank you for coming to [facility] today.  Lab Testing:  **If you had lab testing today and your results are reassuring or normal they will be mailed to you or sent through [facility] within 7 days.   **If the lab tests need quick action we will call you with the results.  The phone number we will call with results is # [phone] (home) . If this is not the best number please call our clinic and change the number.  Medication Refills:  If you need any refills please call your pharmacy and they will contact us.   If you need to  your refill at a new pharmacy, please contact the new pharmacy directly. The new pharmacy will help you get your medications transferred faster.   Scheduling:  If you have any concerns about today's visit or wish to schedule another appointment please call our office during normal business hours [phone] (8-5:00 M-F)  If a referral was made to a [facility] and you don't get a call from central scheduling please call [phone].  If a Mammogram was ordered for you at [facility] call  [phone] to schedule or change your appointment.  If you had an XRay/CT/Ultrasound/MRI ordered the number is [phone] to schedule or change your radiology appointment.   Medical Concerns:  If you have urgent medical concerns please call [phone] at any time of the day.    Chanell Chaidez MD

## 2018-07-31 NOTE — PROGRESS NOTES
HPI       Sakina Banuelos is a 53 year old  who presents for   Chief Complaint   Patient presents with     Shoulder Pain     Pt complains of L shoulder pain x3 mth. Shoulder pain hurts more at night because she sleeps on her L side         Left shoulder pain      Onset: 4 months (last night worsened)    Injury?  no    Description:   Location(s): Left shoulder (anterior and posterior joint line)  Character: Sharp    Intensity: moderate    Progression of Symptoms: intermittent, worsened since last night    Accompanying Signs & Symptoms:  Other symptoms: none    History:   Previous similar pain: Yes Details: 4 months       Worsened by    Overuse?: no  Morning Stiffness?:no, improved with movement.     Alleviating factors:  Improved by: heat and movement  Therapies Tried and outcome: Nothing       +++++++      Problem, Medication and Allergy Lists were      Patient Active Problem List    Diagnosis Date Noted     Iron deficiency anemia  03/08/2018     Priority: Medium     S/P myomectomy 01/23/2013     Priority: Medium     PCOS (polycystic ovarian syndrome) 12/05/2012     Priority: Medium     Health Care Home 10/02/2012     Priority: Medium     Tier 1    DX V65.8 REPLACED WITH [id number] HEALTH CARE HOME (04/08/2013)       Vitamin D deficiency 10/02/2012     Priority: Medium     Problem list name updated by automated process. Provider to review       Screening for malignant neoplasm of cervix 10/02/2012     Priority: Medium     Problem list name updated by automated process. Provider to review       Dyspareunia 10/02/2012     Priority: Medium     Irregular menstrual cycle 10/02/2012     Priority: Medium     Irregular length of menstrual periods  Menstrual cycle intervals irregularly irregular       Obesity 10/02/2012     Priority: Medium     Problem list name updated by automated process. Provider to review       Vaginal leukorrhea 10/02/2012     Priority: Medium     Problem list name updated by automated process. Provider  to review     ,     Current Outpatient Prescriptions   Medication Sig Dispense Refill     Cholecalciferol (VITAMIN D3) 2000 UNITS CAPS Take 1 tablet by mouth daily 30 capsule 11     omeprazole 20 MG tablet Take 1 tablet (20 mg) by mouth daily Take 30-60 minutes before a meal. 90 tablet 3     albuterol (ALBUTEROL) 108 (90 BASE) MCG/ACT inhaler Inhale 1-2 puffs into the lungs every 4 hours as needed for shortness of breath / dyspnea Every 4-6 hours as needed. (Patient not taking: Reported on 3/6/2018) 2 Inhaler 4     hydroquinone 4 % CREA Externally apply topically daily as needed (Patient not taking: Reported on 3/6/2018) 56.8 g 3     meclizine (ANTIVERT) 25 MG tablet Take 1 tablet (25 mg) by mouth every 6 hours as needed for dizziness (Patient not taking: Reported on 7/31/2018) 30 tablet 0     multivitamin, therapeutic with minerals (MULTI-VITAMIN) TABS tablet Take 1 tablet by mouth daily (Patient not taking: Reported on 7/31/2018) 100 tablet 3     tretinoin (RETIN-A) 0.1 % cream Apply topically At Bedtime (Patient not taking: Reported on 3/6/2018) 45 g 3   ,   No Known Allergies.    Patient is an established patient of this clinic..         Review of Systems:   Review of Systems  Skin: negative except as above  Respiratory: negative except as above  Cardiovascular: negative except as above  Gastrointestinal: negative except as above  Genitourinary: negative except as above  Musculoskeletal: negative except as above  Neurologic: negative except as above  Psychiatric: negative except as above  Hematologic/Lymphatic/Immunologic: negative except as above  Endocrine: negative except as above         Physical Exam:     Vitals:    07/31/18 0947   BP: 134/83   Pulse: 74   Temp: 98.5  F (36.9  C)   TempSrc: Oral   SpO2: 100%     There is no height or weight on file to calculate BMI.  Vitals were reviewed and were normal     Physical Exam  Constitutional: Oriented to person, place, and time. Appears well-developed and  well-nourished.   Musculoskeletal: limited range of motion of left shoulder.   Neurological: Alert and oriented to person, place, and time.   Skin: Skin is warm and dry.   Psychiatric: Has a normal mood and affect. Behavior is normal.     ORTHO PHYSICAL EXAM    -OBSERVATION:   No Effusion, Warmth, Erythema     -PALPATION:   TTP over anterior and posterior capsule.     -ACTIVE RANGE OF MOTION:     (Shoulder)  Abduction limited, internal rotation limited, external rotation limited    -SPECIAL TESTS:     (shoulder)   -Abnormal lift off test, belly press, long test.  -Impingement test inconclusive           Results:      Results from this visit  Results for orders placed or performed in visit on 03/06/18   Basic Metabolic Panel ([facility])   Result Value Ref Range    Urea Nitrogen 7.1 7.0 - 19.0 mg/dL    Calcium 9.0 8.5 - 10.1 mg/dL    Chloride 99.7 98.0 - 110.0 mmol/L    Carbon Dioxide 26.0 20.0 - 32.0 mmol/L    Creatinine 0.5 0.5 - 1.0 mg/dL    Glucose 94.4 70.0 - 99.0 mg'dL    Potassium 3.8 3.3 - 4.5 mmol/dL    Sodium 136.6 132.6 - 141.4 mmol/L    GFR Estimate >90 >60.0 mL/min/1.7 m2    GFR Estimate If Black >90 >60.0 mL/min/1.7 m2   TSH with free T4 reflex   Result Value Ref Range    TSH 1.72 0.40 - 4.00 mU/L   CBC with Plt ([facility])   Result Value Ref Range    WBC 5.4 4.0 - 11.0 K/uL    RBC 4.35 3.80 - 5.20 M/uL    Hemoglobin 10.2 (L) 11.7 - 15.7 g/dL    Hematocrit 33.4 (L) 35.0 - 47.0 %    MCV 76.8 (L) 78.0 - 100.0 fL    MCH 23.4 (L) 26.5 - 35.0 pg    MCHC 30.5 (L) 32.0 - 36.0 g/dL    Platelets 219.0 150.0 - 450.0 K/uL   Ferritin   Result Value Ref Range    Ferritin 5 (L) 8 - 252 ng/mL       Assessment and Plan        1. Chronic left shoulder pain  No evidence of arthritis and would treat with muscle strengthening exercises.   - X-ray lt Shoulder G/E 3 vw; Future - No evidence of significant arthritic changes or fracture.   - Physical therapy - [facility] for ROM of left shoulder  - acetaminophen (TYLENOL)  325 MG tablet; Take 2 tablets (650 mg) by mouth every 6 hours as needed for mild pain  Dispense: 100 tablet; Refill: 0  - cyclobenzaprine (FLEXERIL) 5 MG tablet; Take 1 tablet (5 mg) by mouth 3 times daily as needed for muscle spasms  Dispense: 42 tablet; Refill: 0    2. Health care maintenance  - Cholecalciferol (VITAMIN D3) 2000 units CAPS; Take 1 tablet by mouth daily  Dispense: 30 capsule; Refill: 11      Please call or return to clinic if your symptoms don't go away.    Follow up plan  Please make a clinic appointment for follow up with your primary physician Physician No Ref-Primary as needed.      Thank you for coming to [facility] today.       There are no discontinued medications.    Options for treatment and follow-up care were reviewed with the patient. Saikna Banuelos  engaged in the decision making process and verbalized understanding of the options discussed and agreed with the final plan.    Chanell Chaidez MD

## 2018-10-14 NOTE — TELEPHONE ENCOUNTER
Insurance called and stated that this is not an urgent matter and that it will follow their standard turn around time, which could take up to two weeks to get a response.    Abdomen soft, non-tender and non-distended, no rebound, no guarding and no masses. no hepatosplenomegaly.

## 2018-10-26 DIAGNOSIS — K21.9 GASTROESOPHAGEAL REFLUX DISEASE WITHOUT ESOPHAGITIS: ICD-10-CM

## 2018-10-26 NOTE — TELEPHONE ENCOUNTER

## 2018-10-29 RX ORDER — NICOTINE POLACRILEX 4 MG/1
20 GUM, CHEWING ORAL DAILY
Qty: 90 TABLET | Refills: 3 | Status: SHIPPED | OUTPATIENT
Start: 2018-10-29 | End: 2018-12-20

## 2018-12-20 ENCOUNTER — OFFICE VISIT (OUTPATIENT)
Dept: FAMILY MEDICINE | Facility: CLINIC | Age: 53
End: 2018-12-20
Payer: COMMERCIAL

## 2018-12-20 VITALS
TEMPERATURE: 98.2 F | SYSTOLIC BLOOD PRESSURE: 123 MMHG | DIASTOLIC BLOOD PRESSURE: 84 MMHG | OXYGEN SATURATION: 100 % | HEART RATE: 67 BPM

## 2018-12-20 DIAGNOSIS — K08.89 PAIN, DENTAL: ICD-10-CM

## 2018-12-20 DIAGNOSIS — K21.9 GASTROESOPHAGEAL REFLUX DISEASE WITHOUT ESOPHAGITIS: ICD-10-CM

## 2018-12-20 DIAGNOSIS — Z71.84 ENCOUNTER FOR COUNSELING FOR TRAVEL: Primary | ICD-10-CM

## 2018-12-20 DIAGNOSIS — R21 RASH: ICD-10-CM

## 2018-12-20 RX ORDER — NICOTINE POLACRILEX 4 MG/1
20 GUM, CHEWING ORAL DAILY
Qty: 90 TABLET | Refills: 0 | Status: SHIPPED | OUTPATIENT
Start: 2018-12-20 | End: 2019-10-31

## 2018-12-20 RX ORDER — CIPROFLOXACIN 500 MG/1
TABLET, FILM COATED ORAL
Qty: 18 TABLET | Refills: 0 | Status: SHIPPED | OUTPATIENT
Start: 2018-12-20 | End: 2019-11-15

## 2018-12-20 RX ORDER — DIAPER,BRIEF,INFANT-TODD,DISP
EACH MISCELLANEOUS 2 TIMES DAILY
Qty: 30 G | Refills: 0 | Status: SHIPPED | OUTPATIENT
Start: 2018-12-20 | End: 2019-11-15

## 2018-12-20 RX ORDER — IBUPROFEN 600 MG/1
600 TABLET, FILM COATED ORAL EVERY 6 HOURS PRN
Qty: 60 TABLET | Refills: 0 | Status: SHIPPED | OUTPATIENT
Start: 2018-12-20 | End: 2019-04-20

## 2018-12-20 RX ORDER — MEFLOQUINE HYDROCHLORIDE 250 MG/1
250 TABLET ORAL
Qty: 20 TABLET | Refills: 0 | Status: SHIPPED | OUTPATIENT
Start: 2018-12-20 | End: 2019-11-15

## 2018-12-20 NOTE — PROGRESS NOTES
HPI       Sakina Banuelos is a 53 year old  who presents for   Chief Complaint   Patient presents with     Recheck Medication     Going to [place] would like     54 yo woman with benign PMH presents for travel medications. Will be in [place] and [facility] for a period of 4 months. Has been to travel clinic for vaccines. Requests Rx for travellers diarrhea and malaria prophylaxis today.     Also mentions dark skin on breast that she would like looked at.      +++++++      Problem, Medication and Allergy Lists were   reviewed and updated if needed.     Patient Active Problem List    Diagnosis Date Noted     Iron deficiency anemia  03/08/2018     Priority: Medium     S/P myomectomy 01/23/2013     Priority: Medium     PCOS (polycystic ovarian syndrome) 12/05/2012     Priority: Medium     Health Care Home 10/02/2012     Priority: Medium     Tier 1    DX V65.8 REPLACED WITH [id number] HEALTH CARE HOME (04/08/2013)       Vitamin D deficiency 10/02/2012     Priority: Medium     Problem list name updated by automated process. Provider to review       Screening for malignant neoplasm of cervix 10/02/2012     Priority: Medium     Problem list name updated by automated process. Provider to review       Dyspareunia 10/02/2012     Priority: Medium     Irregular menstrual cycle 10/02/2012     Priority: Medium     Irregular length of menstrual periods  Menstrual cycle intervals irregularly irregular       Obesity 10/02/2012     Priority: Medium     Problem list name updated by automated process. Provider to review       Vaginal leukorrhea 10/02/2012     Priority: Medium     Problem list name updated by automated process. Provider to review           Current Outpatient Medications   Medication Sig Dispense Refill     acetaminophen (TYLENOL) 325 MG tablet Take 2 tablets (650 mg) by mouth every 6 hours as needed for mild pain 100 tablet 0     Cholecalciferol (VITAMIN D3) 2000 units CAPS Take 1 tablet by mouth daily 30 capsule 11      ciprofloxacin (CIPRO) 500 MG tablet Take 1 tablet twice a day x 3 days for travelers diarrhea 18 tablet 0     cyclobenzaprine (FLEXERIL) 5 MG tablet Take 1 tablet (5 mg) by mouth 3 times daily as needed for muscle spasms 42 tablet 0     hydrocortisone (CORTIZONE-10) 1 % external ointment Apply topically 2 times daily 30 g 0     ibuprofen (ADVIL/MOTRIN) 600 MG tablet Take 1 tablet (600 mg) by mouth every 6 hours as needed for moderate pain 60 tablet 0     mefloquine (LARIAM) 250 MG tablet Take 1 tablet (250 mg) by mouth every 7 days 20 tablet 0     omeprazole 20 MG tablet Take 1 tablet (20 mg) by mouth daily Take 30-60 minutes before a meal. 90 tablet 0     multivitamin, therapeutic with minerals (MULTI-VITAMIN) TABS tablet Take 1 tablet by mouth daily (Patient not taking: Reported on 7/31/2018) 100 tablet 3   .    Patient is an established patient of this clinic..         Review of Systems:   Review of Systems   Constitutional: Negative.    Respiratory: Negative.    Cardiovascular: Negative.    Gastrointestinal: Positive for abdominal pain. Negative for abdominal distention, anal bleeding, blood in stool, constipation, diarrhea, nausea, rectal pain and vomiting.        GERD. Takes PPI prn   Endocrine: Negative.    Genitourinary: Negative.    Musculoskeletal: Negative.    Neurological: Negative.    Hematological: Negative.    Psychiatric/Behavioral: Negative.             Physical Exam:     Vitals:    12/20/18 1552   BP: 123/84   Pulse: 67   Temp: 98.2  F (36.8  C)   TempSrc: Oral   SpO2: 100%     There is no height or weight on file to calculate BMI.  Vitals were reviewed and were normal     Physical Exam   Constitutional: She appears well-developed and well-nourished. No distress.   Pulmonary/Chest:       Abdominal: Soft. There is no tenderness.         Results:   No testing ordered today    Assessment and Plan        Sakina was seen today for recheck medication.    Diagnoses and all orders for this  visit:    Encounter for counseling for travel  -     ciprofloxacin (CIPRO) 500 MG tablet; Take 1 tablet twice a day x 3 days for travelers diarrhea  -     mefloquine (LARIAM) 250 MG tablet; Take 1 tablet (250 mg) by mouth every 7 days  -     Declines flu shot. Says additional vaccines obtained at a travel clinic.    Gastroesophageal reflux disease without esophagitis  -     omeprazole 20 MG tablet; Take 1 tablet (20 mg) by mouth daily Take 30-60 minutes before a meal.    Rash, breast  -     hydrocortisone (CORTIZONE-10) 1 % external ointment; Apply topically 2 times daily  -     RTC for recheck if not resolved           There are no discontinued medications.    Options for treatment and follow-up care were reviewed with the patient. Sakina Banuelos  engaged in the decision making process and verbalized understanding of the options discussed and agreed with the final plan.    Padmini Lee MD

## 2018-12-24 ASSESSMENT — ENCOUNTER SYMPTOMS
VOMITING: 0
NAUSEA: 0
HEMATOLOGIC/LYMPHATIC NEGATIVE: 1
ABDOMINAL PAIN: 1
ABDOMINAL DISTENTION: 0
PSYCHIATRIC NEGATIVE: 1
ANAL BLEEDING: 0
NEUROLOGICAL NEGATIVE: 1
BLOOD IN STOOL: 0
RECTAL PAIN: 0
RESPIRATORY NEGATIVE: 1
CONSTIPATION: 0
MUSCULOSKELETAL NEGATIVE: 1
DIARRHEA: 0
ENDOCRINE NEGATIVE: 1
CARDIOVASCULAR NEGATIVE: 1
CONSTITUTIONAL NEGATIVE: 1

## 2019-04-19 DIAGNOSIS — Z00.00 HEALTH CARE MAINTENANCE: ICD-10-CM

## 2019-04-19 DIAGNOSIS — K21.9 GASTROESOPHAGEAL REFLUX DISEASE WITHOUT ESOPHAGITIS: ICD-10-CM

## 2019-04-20 RX ORDER — IBUPROFEN 600 MG/1
600 TABLET, FILM COATED ORAL EVERY 6 HOURS PRN
Qty: 60 TABLET | Refills: 0 | Status: SHIPPED | OUTPATIENT
Start: 2019-04-20 | End: 2019-11-15

## 2019-04-20 RX ORDER — ACETAMINOPHEN 160 MG
1 TABLET,DISINTEGRATING ORAL DAILY
Qty: 30 CAPSULE | Refills: 11 | Status: SHIPPED | OUTPATIENT
Start: 2019-04-20 | End: 2020-05-14

## 2019-04-24 ENCOUNTER — ALLIED HEALTH/NURSE VISIT (OUTPATIENT)
Dept: FAMILY MEDICINE | Facility: CLINIC | Age: 54
End: 2019-04-24
Payer: COMMERCIAL

## 2019-04-24 DIAGNOSIS — Z23 ENCOUNTER FOR IMMUNIZATION: Primary | ICD-10-CM

## 2019-05-16 ENCOUNTER — OFFICE VISIT (OUTPATIENT)
Dept: FAMILY MEDICINE | Facility: CLINIC | Age: 54
End: 2019-05-16
Payer: COMMERCIAL

## 2019-05-16 VITALS
DIASTOLIC BLOOD PRESSURE: 85 MMHG | HEART RATE: 65 BPM | HEIGHT: 65 IN | OXYGEN SATURATION: 99 % | SYSTOLIC BLOOD PRESSURE: 128 MMHG | BODY MASS INDEX: 38.63 KG/M2

## 2019-05-16 DIAGNOSIS — G89.29 CHRONIC LEFT-SIDED LOW BACK PAIN WITHOUT SCIATICA: Primary | ICD-10-CM

## 2019-05-16 DIAGNOSIS — M54.50 CHRONIC LEFT-SIDED LOW BACK PAIN WITHOUT SCIATICA: Primary | ICD-10-CM

## 2019-05-16 DIAGNOSIS — R76.11 MANTOUX: POSITIVE: ICD-10-CM

## 2019-05-16 DIAGNOSIS — M62.838 CERVICAL PARASPINAL MUSCLE SPASM: ICD-10-CM

## 2019-05-16 DIAGNOSIS — R53.82 CHRONIC FATIGUE: ICD-10-CM

## 2019-05-16 RX ORDER — LIDOCAINE 40 MG/G
1 CREAM TOPICAL 2 TIMES DAILY PRN
Qty: 45 G | Refills: 3 | Status: SHIPPED | OUTPATIENT
Start: 2019-05-16 | End: 2020-03-27

## 2019-05-16 RX ORDER — LIDOCAINE/PRILOCAINE 2.5 %-2.5%
CREAM (GRAM) TOPICAL PRN
Qty: 30 G | Refills: 3 | Status: SHIPPED | OUTPATIENT
Start: 2019-05-16 | End: 2020-03-27

## 2019-05-16 NOTE — PROGRESS NOTES
Preceptor Attestation:   Patient seen and discussed with the resident. Assessment and plan reviewed with resident and agreed upon.   Supervising Physician:  Alexis Barba MD  [facility]

## 2019-05-16 NOTE — PROGRESS NOTES
FELICIA Banuelos is a 54 year old  who presents for   Chief Complaint   Patient presents with     Musculoskeletal Problem     left       Back Pain      Duration: months, acutely worsening        Specific cause: none    Description:   Location of pain: low back left, upper back left, neck left and waist left  Character of pain: dull ache  Pain radiation:none  New numbness or weakness in legs, not attributed to pain:  No   Any new bowel or bladder incontinence?No     Intensity: mild    History:   Pain interferes with job/home/school: No   History of back problems: no prior back problems  Any previous MRI or X-rays: None  Sees a specialist for back pain:  No  Therapies tried without relief: none    Alleviating factors:   Improved by: none      Precipitating factors:  Worsened by: Lifting      Accompanying Signs & Symptoms:  Risk of Fracture: No   Risk of Cauda Equina:No   Risk of Infection:  No   Risk of Cancer:  No       Fatigue    Onset: several months    Description: How severe is the fatigue? mild    Does the fatigue interfere with your life:no     How much sleep are you getting? 6-7 Hours     How much sleep do you normally need to feel well? same  Exercise: no regular exercise program  Are there episodes of normal energy levels: yes     Accompanying Signs & Symptoms:  Falling asleep during the day: no  Snoring: no  Do you stop breathing while sleeping: no                 Night sweats: no      Fevers:no  Chest Pain: no  Pain other places in the body? YES- back (upper and lower paraspinal musculature)  Change in appetite: no                 Weight gain/loss: no  Dark or bloody stools: no  Heavy periods (women) no  Substance use:             Caffeine use:YES- coffee and teas daily              Alcohol use: never used               Illicit drug use:None    Mood  Depressed mood: no  Any new anxiety/stressors:no  Any new deaths or losses? no  No flowsheet data found.      Therapies tried:  None.          +++++++    Problem, Medication and Allergy Lists were      Patient Active Problem List    Diagnosis Date Noted     Iron deficiency anemia  03/08/2018     Priority: Medium     S/P myomectomy 01/23/2013     Priority: Medium     PCOS (polycystic ovarian syndrome) 12/05/2012     Priority: Medium     Health Care Home 10/02/2012     Priority: Medium     Tier 1    DX V65.8 REPLACED WITH [id number] HEALTH CARE HOME (04/08/2013)       Vitamin D deficiency 10/02/2012     Priority: Medium     Problem list name updated by automated process. Provider to review       Screening for malignant neoplasm of cervix 10/02/2012     Priority: Medium     Problem list name updated by automated process. Provider to review       Dyspareunia 10/02/2012     Priority: Medium     Irregular menstrual cycle 10/02/2012     Priority: Medium     Irregular length of menstrual periods  Menstrual cycle intervals irregularly irregular       Obesity 10/02/2012     Priority: Medium     Problem list name updated by automated process. Provider to review       Vaginal leukorrhea 10/02/2012     Priority: Medium     Problem list name updated by automated process. Provider to review     ,     Current Outpatient Medications   Medication Sig Dispense Refill     acetaminophen (TYLENOL) 325 MG tablet Take 2 tablets (650 mg) by mouth every 6 hours as needed for mild pain 100 tablet 0     Cholecalciferol (VITAMIN D3) 2000 units CAPS Take 1 tablet by mouth daily 30 capsule 11     lidocaine (LMX4) 4 % external cream Apply 1 g topically 2 times daily as needed for mild pain 45 g 3     lidocaine-prilocaine (EMLA) 2.5-2.5 % external cream Apply topically as needed for moderate pain 30 g 3     omeprazole 20 MG tablet Take 1 tablet (20 mg) by mouth daily Take 30-60 minutes before a meal. 90 tablet 0     ciprofloxacin (CIPRO) 500 MG tablet Take 1 tablet twice a day x 3 days for travelers diarrhea (Patient not taking: Reported on 5/16/2019) 18 tablet 0     cyclobenzaprine  (FLEXERIL) 5 MG tablet Take 1 tablet (5 mg) by mouth 3 times daily as needed for muscle spasms (Patient not taking: Reported on 5/16/2019) 42 tablet 0     hydrocortisone (CORTIZONE-10) 1 % external ointment Apply topically 2 times daily (Patient not taking: Reported on 5/16/2019) 30 g 0     ibuprofen (ADVIL/MOTRIN) 600 MG tablet Take 1 tablet (600 mg) by mouth every 6 hours as needed for moderate pain (Patient not taking: Reported on 5/16/2019) 60 tablet 0     mefloquine (LARIAM) 250 MG tablet Take 1 tablet (250 mg) by mouth every 7 days (Patient not taking: Reported on 5/16/2019) 20 tablet 0     multivitamin, therapeutic with minerals (MULTI-VITAMIN) TABS tablet Take 1 tablet by mouth daily (Patient not taking: Reported on 7/31/2018) 100 tablet 3   ,   No Known Allergies.    Patient is   an established patient of this clinic.  Past Medical History:   Diagnosis Date     Fibroid, uterine      Infertility, female      Family History   Problem Relation Age of Onset     Diabetes Mother      Coronary Artery Disease No family hx of      Hypertension No family hx of      Hyperlipidemia No family hx of      Cerebrovascular Disease No family hx of      Breast Cancer No family hx of      Colon Cancer No family hx of      Prostate Cancer No family hx of      Social History     Socioeconomic History     Marital status: Single     Spouse name: None     Number of children: None     Years of education: None     Highest education level: None   Occupational History     Employer: [employer]     Comment: [employer]   Social Needs     Financial resource strain: None     Food insecurity:     Worry: None     Inability: None     Transportation needs:     Medical: None     Non-medical: None   Tobacco Use     Smoking status: Never Smoker     Smokeless tobacco: Never Used   Substance and Sexual Activity     Alcohol use: No     Drug use: No     Sexual activity: Yes     Partners: Male   Lifestyle     Physical activity:     Days per week: None      "Minutes per session: None     Stress: None   Relationships     Social connections:     Talks on phone: None     Gets together: None     Attends Jainism service: None     Active member of club or organization: None     Attends meetings of clubs or organizations: None     Relationship status: None     Intimate partner violence:     Fear of current or ex partner: None     Emotionally abused: None     Physically abused: None     Forced sexual activity: None   Other Topics Concern      Service No     Blood Transfusions No     Caffeine Concern No     Occupational Exposure No     Hobby Hazards No     Sleep Concern No     Stress Concern No     Weight Concern Yes     Special Diet No     Back Care No     Exercise Yes     Bike Helmet Yes     Comment: n/a     Seat Belt Yes     Self-Exams Yes     Parent/sibling w/ CABG, MI or angioplasty before 65F 55M? No   Social History Narrative    . Italian .            .         Review of Systems:   Review of Systems  Skin: negative except as above  Respiratory: negative except as above  Cardiovascular: negative except as above  Gastrointestinal: negative except as above  Genitourinary: negative except as above  Musculoskeletal: negative except as above  Neurologic: negative except as above  Psychiatric: negative except as above  Hematologic/Lymphatic/Immunologic: negative except as above  Endocrine: negative except as above         Physical Exam:     Vitals:    05/16/19 1347   BP: 128/85   Pulse: 65   SpO2: 99%   Height: 1.647 m (5' 4.84\")     Body mass index is 38.63 kg/m .  Vitals were reviewed and were normal     Physical Exam  Constitutional: Oriented to person, place, and time. Appears well-developed and well-nourished.   Cardiovascular: Normal rate, regular rhythm, normal heart sounds and intact distal pulses.    Pulmonary/Chest: Effort normal and breath sounds normal. No respiratory distress. No wheezes.   Abdominal: Soft. Exhibits no distension. There is " no tenderness.   Musculoskeletal: Normal range of motion.   Neurological: Alert and oriented to person, place, and time.   Skin: Skin is warm and dry.   Psychiatric: Has a normal mood and affect. Behavior is normal.       Results:   No testing ordered today    Assessment and Plan        1. Chronic left-sided cervical and low back pain without sciatica  Likely related to lack of exercise and muscular dehydration along with poor posture.  Would highly recommend physical therapy to strengthen the upper and lower back along with the core.  If there is no improvement with physical therapy we will consider a muscle relaxer.  - PHYSICAL THERAPY REFERRAL - EXTERNAL; Future    2. Chronic fatigue  Very nonspecific findings however due to chronicity we will do complete work-up including the following listed below.  - TSH- future  - Lipid Cascade ([facility]); Future  - Comprehensive Metabolic Panel ([facility]); Future  - CBC with Plt ([facility]); Future  - Hemoglobin A1c ([facility]); Future    3. Mantoux: positive  However due to reader dependence and patient concerned about false positive we will perform a QuantiFERON gold prior to performing a chest x-ray.  Patient denies any symptoms at this time and is planning to return to clinic for lab only visit when she is not fasting.  - Quantiferon TB Gold Plus; Future    Please call or return to clinic if your symptoms don't go away.    Follow up plan  Please make a clinic appointment for follow up with me after drawing labs to review.     Thank you for coming to [facility] today.     There are no discontinued medications.    Options for treatment and follow-up care were reviewed with the patient. Sakina Banuelos  engaged in the decision making process and verbalized understanding of the options discussed and agreed with the final plan.    Chanell Chaidez MD

## 2019-05-24 DIAGNOSIS — R76.11 MANTOUX: POSITIVE: ICD-10-CM

## 2019-05-24 DIAGNOSIS — R53.82 CHRONIC FATIGUE: ICD-10-CM

## 2019-05-24 LAB
ALBUMIN SERPL-MCNC: 4 MG/DL (ref 3.8–5)
ALP SERPL-CCNC: 45 U/L (ref 31.7–110.5)
ALT SERPL-CCNC: 11.2 U/L (ref 0–45)
AST SERPL-CCNC: 10.3 U/L (ref 0–45)
BILIRUB SERPL-MCNC: <0.4 MG/DL (ref 0.2–1.3)
BUN SERPL-MCNC: 8.4 MG/DL (ref 7–19)
CALCIUM SERPL-MCNC: 8.8 MG/DL (ref 8.5–10.1)
CHLORIDE SERPLBLD-SCNC: 100.7 MMOL/L (ref 98–110)
CHOLEST SERPL-MCNC: 174.8 MG/DL (ref 0–200)
CHOLEST/HDLC SERPL: 3.3 {RATIO} (ref 0–5)
CO2 SERPL-SCNC: 24.6 MMOL/L (ref 20–32)
CREAT SERPL-MCNC: 0.5 MG/DL (ref 0.5–1)
GFR SERPL CREATININE-BSD FRML MDRD: >90 ML/MIN/1.7 M2
GLUCOSE SERPL-MCNC: 108.5 MG'DL (ref 70–99)
HBA1C MFR BLD: 5.5 % (ref 4.1–5.7)
HCT VFR BLD AUTO: 32.1 % (ref 35–47)
HDLC SERPL-MCNC: 52.5 MG/DL
HEMOGLOBIN: 9.3 G/DL (ref 11.7–15.7)
LDLC SERPL CALC-MCNC: 111 MG/DL (ref 0–129)
MCH RBC QN AUTO: 21.1 PG (ref 26.5–35)
MCHC RBC AUTO-ENTMCNC: 29 G/DL (ref 32–36)
MCV RBC AUTO: 72.9 FL (ref 78–100)
PLATELET # BLD AUTO: 235 K/UL (ref 150–450)
POTASSIUM SERPL-SCNC: 3.8 MMOL/DL (ref 3.3–4.5)
PROT SERPL-MCNC: 7.1 G/DL (ref 6.8–8.8)
RBC # BLD AUTO: 4.4 M/UL (ref 3.8–5.2)
SODIUM SERPL-SCNC: 136.7 MMOL/L (ref 132.6–141.4)
TRIGL SERPL-MCNC: 55.5 MG/DL (ref 0–150)
TSH SERPL DL<=0.005 MIU/L-ACNC: 2.32 MU/L (ref 0.4–4)
VLDL CHOLESTEROL: 11.1 MG/DL (ref 7–32)
WBC # BLD AUTO: 4.4 K/UL (ref 4–11)

## 2019-05-24 NOTE — LETTER
May 24, 2019      Sakina Banuelos  [address]  [address]  [place] [facility] [address]        Dear Sakina,    Thank you for getting your care at [facility]. Please see below for your test results.    Resulted Orders   TSH with free T4 reflex   Result Value Ref Range    TSH 2.32 0.40 - 4.00 mU/L   Hemoglobin A1c ([facility])   Result Value Ref Range    Hemoglobin A1C 5.5 4.1 - 5.7 %   CBC with Plt ([facility])   Result Value Ref Range    WBC 4.4 4.0 - 11.0 K/uL    RBC 4.40 3.80 - 5.20 M/uL    Hemoglobin 9.3 (L) 11.7 - 15.7 g/dL    Hematocrit 32.1 (L) 35.0 - 47.0 %    MCV 72.9 (L) 78.0 - 100.0 fL    MCH 21.1 (L) 26.5 - 35.0 pg    MCHC 29.0 (L) 32.0 - 36.0 g/dL    Platelets 235.0 150.0 - 450.0 K/uL   Comprehensive Metabolic Panel ([facility])   Result Value Ref Range    Albumin 4.0 3.8 - 5.0 mg/dL    Alkaline Phosphatase 45.0 31.7 - 110.5 U/L    ALT 11.2 0.0 - 45.0 U/L    AST 10.3 0.0 - 45.0 U/L    Bilirubin Total <0.4 0.2 - 1.3 mg/dL    Urea Nitrogen 8.4 7.0 - 19.0 mg/dL    Calcium 8.8 8.5 - 10.1 mg/dL    Chloride 100.7 98.0 - 110.0 mmol/L    Carbon Dioxide 24.6 20.0 - 32.0 mmol/L    Creatinine 0.5 0.5 - 1.0 mg/dL    Glucose 108.5 (H) 70.0 - 99.0 mg'dL    Potassium 3.8 3.3 - 4.5 mmol/dL    Sodium 136.7 132.6 - 141.4 mmol/L    Protein Total 7.1 6.8 - 8.8 g/dL    GFR Estimate >90 >60.0 mL/min/1.7 m2    GFR Estimate If Black >90 >60.0 mL/min/1.7 m2   Lipid Cascade ([facility])   Result Value Ref Range    Cholesterol 174.8 0.0 - 200.0 mg/dL    Cholesterol/HDL Ratio 3.3 0.0 - 5.0    HDL Cholesterol 52.5 >40.0 mg/dL    LDL Cholesterol Calculated 111 0 - 129 mg/dL    Triglycerides 55.5 0.0 - 150.0 mg/dL    VLDL Cholesterol 11.1 7.0 - 32.0 mg/dL       If you have any concerns about these results please call and leave a message for me or send a MyChart message to the clinic.    Sincerely,    Chanell hCaidez MD

## 2019-05-24 NOTE — LETTER
May 24, 2019      Sakina Banuelos  [address]  [address]  [facility] [id number]        Dear Sakina,    Thank you for getting your care at [facility]. Please see below for your test results.    Resulted Orders   Hemoglobin A1c (Merged with [facility])   Result Value Ref Range    Hemoglobin A1C 5.5 4.1 - 5.7 %   CBC with Plt (Merged with [facility])   Result Value Ref Range    WBC 4.4 4.0 - 11.0 K/uL    RBC 4.40 3.80 - 5.20 M/uL    Hemoglobin 9.3 (L) 11.7 - 15.7 g/dL    Hematocrit 32.1 (L) 35.0 - 47.0 %    MCV 72.9 (L) 78.0 - 100.0 fL    MCH 21.1 (L) 26.5 - 35.0 pg    MCHC 29.0 (L) 32.0 - 36.0 g/dL    Platelets 235.0 150.0 - 450.0 K/uL   Comprehensive Metabolic Panel (Merged with [facility])   Result Value Ref Range    Albumin 4.0 3.8 - 5.0 mg/dL    Alkaline Phosphatase 45.0 31.7 - 110.5 U/L    ALT 11.2 0.0 - 45.0 U/L    AST 10.3 0.0 - 45.0 U/L    Bilirubin Total <0.4 0.2 - 1.3 mg/dL    Urea Nitrogen 8.4 7.0 - 19.0 mg/dL    Calcium 8.8 8.5 - 10.1 mg/dL    Chloride 100.7 98.0 - 110.0 mmol/L    Carbon Dioxide 24.6 20.0 - 32.0 mmol/L    Creatinine 0.5 0.5 - 1.0 mg/dL    Glucose 108.5 (H) 70.0 - 99.0 mg'dL    Potassium 3.8 3.3 - 4.5 mmol/dL    Sodium 136.7 132.6 - 141.4 mmol/L    Protein Total 7.1 6.8 - 8.8 g/dL    GFR Estimate >90 >60.0 mL/min/1.7 m2    GFR Estimate If Black >90 >60.0 mL/min/1.7 m2   Lipid Cascade (Merged with [facility])   Result Value Ref Range    Cholesterol 174.8 0.0 - 200.0 mg/dL    Cholesterol/HDL Ratio 3.3 0.0 - 5.0    HDL Cholesterol 52.5 >40.0 mg/dL    LDL Cholesterol Calculated 111 0 - 129 mg/dL    Triglycerides 55.5 0.0 - 150.0 mg/dL    VLDL Cholesterol 11.1 7.0 - 32.0 mg/dL       If you have any concerns about these results please call and leave a message for me or send a MyChart message to the clinic.    Sincerely,    Chanell Chaidez MD

## 2019-05-28 DIAGNOSIS — G89.29 CHRONIC LEFT SHOULDER PAIN: ICD-10-CM

## 2019-05-28 DIAGNOSIS — M25.512 CHRONIC LEFT SHOULDER PAIN: ICD-10-CM

## 2019-05-28 LAB
GAMMA INTERFERON BACKGROUND BLD IA-ACNC: 0.05 IU/ML
M TB IFN-G BLD-IMP: NEGATIVE
M TB IFN-G CD4+ BCKGRND COR BLD-ACNC: >10 IU/ML
MITOGEN IGNF BCKGRD COR BLD-ACNC: 0.04 IU/ML
MITOGEN IGNF BCKGRD COR BLD-ACNC: 0.07 IU/ML

## 2019-05-29 RX ORDER — ACETAMINOPHEN 325 MG/1
650 TABLET ORAL EVERY 6 HOURS PRN
Qty: 100 TABLET | Refills: 0 | Status: SHIPPED | OUTPATIENT
Start: 2019-05-29 | End: 2020-03-27

## 2019-05-30 ENCOUNTER — OFFICE VISIT (OUTPATIENT)
Dept: FAMILY MEDICINE | Facility: CLINIC | Age: 54
End: 2019-05-30
Payer: COMMERCIAL

## 2019-05-30 VITALS
OXYGEN SATURATION: 98 % | HEART RATE: 68 BPM | TEMPERATURE: 97.9 F | SYSTOLIC BLOOD PRESSURE: 120 MMHG | DIASTOLIC BLOOD PRESSURE: 76 MMHG | RESPIRATION RATE: 16 BRPM

## 2019-05-30 DIAGNOSIS — D50.8 IRON DEFICIENCY ANEMIA SECONDARY TO INADEQUATE DIETARY IRON INTAKE: Primary | ICD-10-CM

## 2019-05-30 NOTE — PROGRESS NOTES
Preceptor Attestation:   Patient seen, evaluated and discussed with the resident. I have verified the content of the note, which accurately reflects my assessment of the patient and the plan of care.   Supervising Physician:  Holley Bella MD

## 2019-05-30 NOTE — PATIENT INSTRUCTIONS
Here is the plan from today's visit    1.  Fatigue likely related to iron deficiency anemia   Given the fact that she has previously been worked up for iron deficiency anemia and was noted to have a low ferritin, I did not feel the need to repeat an iron panel at this time.  She has previously been on iron supplementation and has recently discontinued that.  It appears her fluctuation in baseline hemoglobin levels range from 9-10 and are based solely on where she is in her menstrual cycle.  At this time I advised her to avoid using any antacids including omeprazole or Zantac as this is been linked to iron deficiency.  I also advised her to take iron tablets with some form of acid.  -Planning to increase activity  -Other lifestyle changes include limiting sugar intake    Please call or return to clinic if your symptoms don't go away.    Follow up plan  Please make a clinic appointment for follow up with your primary physician Lauren Ferrell MD as needed.     Thank you for coming to [facility] today.  Lab Testing:  **If you had lab testing today and your results are reassuring or normal they will be mailed to you or sent through [employer] within 7 days.   **If the lab tests need quick action we will call you with the results.  The phone number we will call with results is # [phone] (home) . If this is not the best number please call our clinic and change the number.  Medication Refills:  If you need any refills please call your pharmacy and they will contact us.   If you need to  your refill at a new pharmacy, please contact the new pharmacy directly. The new pharmacy will help you get your medications transferred faster.   Scheduling:  If you have any concerns about today's visit or wish to schedule another appointment please call our office during normal business hours [phone] (8-5:00 M-F)  If a referral was made to a [facility] and you don't get a call from [facility]  scheduling please call [phone].  If a Mammogram was ordered for you at [facility] call [phone] to schedule or change your appointment.  If you had an XRay/CT/Ultrasound/MRI ordered the number is [phone] to schedule or change your radiology appointment.   Medical Concerns:  If you have urgent medical concerns please call [phone] at any time of the day.    Chanell Chaidez MD

## 2019-05-30 NOTE — PROGRESS NOTES
HPI       Sakina Banuelos is a 54 year old  who presents for   Chief Complaint   Patient presents with     Follow Up     follow up for lab result      Patient is a well-known mid 42-year-old female who is coming in today for review over her recent lab work-up.  She was recently seen for fatigue work-up including routine labs and is currently fasting.  She is not postmenopausal and has menstrual cycles regularly with increased flow after the first 3 days.  Only pertinent lab findings were a mild anemia with a hemoglobin of 9.3.  Typically runs between 9 and 10 depending on where she is with her menstrual cycle.  She has been worked up in the past and was noted to have a low ferritin and was previously on iron supplementation however has not been compliant with her meds recently.  Her symptoms have been waxing and waning and currently seem to be under control.    At this time she denies any nausea, vomiting, fever, chills, shortness of breath, chest pain, abdominal pain, numbness or tingling in the extremities.    +++++++    Problem, Medication and Allergy Lists were      Patient Active Problem List    Diagnosis Date Noted     Iron deficiency anemia  03/08/2018     Priority: Medium     S/P myomectomy 01/23/2013     Priority: Medium     PCOS (polycystic ovarian syndrome) 12/05/2012     Priority: Medium     Health Care Home 10/02/2012     Priority: Medium     Tier 1    DX V65.8 REPLACED WITH 42043 [facility]ALTH CARE HOME (04/08/2013)       Vitamin D deficiency 10/02/2012     Priority: Medium     Problem list name updated by automated process. Provider to review       Screening for malignant neoplasm of cervix 10/02/2012     Priority: Medium     Problem list name updated by automated process. Provider to review       Dyspareunia 10/02/2012     Priority: Medium     Irregular menstrual cycle 10/02/2012     Priority: Medium     Irregular length of menstrual periods  Menstrual cycle intervals irregularly irregular       Obesity  10/02/2012     Priority: Medium     Problem list name updated by automated process. Provider to review       Vaginal leukorrhea 10/02/2012     Priority: Medium     Problem list name updated by automated process. Provider to review     ,     Current Outpatient Medications   Medication Sig Dispense Refill     acetaminophen (TYLENOL) 325 MG tablet Take 2 tablets (650 mg) by mouth every 6 hours as needed for mild pain 100 tablet 0     Cholecalciferol (VITAMIN D3) 2000 units CAPS Take 1 tablet by mouth daily 30 capsule 11     omeprazole 20 MG tablet Take 1 tablet (20 mg) by mouth daily Take 30-60 minutes before a meal. 90 tablet 0     ciprofloxacin (CIPRO) 500 MG tablet Take 1 tablet twice a day x 3 days for travelers diarrhea (Patient not taking: Reported on 5/16/2019) 18 tablet 0     cyclobenzaprine (FLEXERIL) 5 MG tablet Take 1 tablet (5 mg) by mouth 3 times daily as needed for muscle spasms (Patient not taking: Reported on 5/16/2019) 42 tablet 0     hydrocortisone (CORTIZONE-10) 1 % external ointment Apply topically 2 times daily (Patient not taking: Reported on 5/16/2019) 30 g 0     ibuprofen (ADVIL/MOTRIN) 600 MG tablet Take 1 tablet (600 mg) by mouth every 6 hours as needed for moderate pain (Patient not taking: Reported on 5/16/2019) 60 tablet 0     lidocaine (LMX4) 4 % external cream Apply 1 g topically 2 times daily as needed for mild pain 45 g 3     lidocaine-prilocaine (EMLA) 2.5-2.5 % external cream Apply topically as needed for moderate pain 30 g 3     mefloquine (LARIAM) 250 MG tablet Take 1 tablet (250 mg) by mouth every 7 days (Patient not taking: Reported on 5/16/2019) 20 tablet 0     multivitamin, therapeutic with minerals (MULTI-VITAMIN) TABS tablet Take 1 tablet by mouth daily (Patient not taking: Reported on 7/31/2018) 100 tablet 3   ,   No Known Allergies.    Patient is   an established patient of this clinic.  Past Medical History:   Diagnosis Date     Fibroid, uterine      Infertility, female       Family History   Problem Relation Age of Onset     Diabetes Mother      Coronary Artery Disease No family hx of      Hypertension No family hx of      Hyperlipidemia No family hx of      Cerebrovascular Disease No family hx of      Breast Cancer No family hx of      Colon Cancer No family hx of      Prostate Cancer No family hx of      Social History     Socioeconomic History     Marital status: Single     Spouse name: None     Number of children: None     Years of education: None     Highest education level: None   Occupational History     Employer: [employer]     Comment: [employer]   Social Needs     Financial resource strain: None     Food insecurity:     Worry: None     Inability: None     Transportation needs:     Medical: None     Non-medical: None   Tobacco Use     Smoking status: Never Smoker     Smokeless tobacco: Never Used   Substance and Sexual Activity     Alcohol use: No     Drug use: No     Sexual activity: Yes     Partners: Male   Lifestyle     Physical activity:     Days per week: None     Minutes per session: None     Stress: None   Relationships     Social connections:     Talks on phone: None     Gets together: None     Attends Jain service: None     Active member of club or organization: None     Attends meetings of clubs or organizations: None     Relationship status: None     Intimate partner violence:     Fear of current or ex partner: None     Emotionally abused: None     Physically abused: None     Forced sexual activity: None   Other Topics Concern      Service No     Blood Transfusions No     Caffeine Concern No     Occupational Exposure No     Hobby Hazards No     Sleep Concern No     Stress Concern No     Weight Concern Yes     Special Diet No     Back Care No     Exercise Yes     Bike Helmet Yes     Comment: n/a     Seat Belt Yes     Self-Exams Yes     Parent/sibling w/ CABG, MI or angioplasty before 65F 55M? No   Social History Narrative    . [place] .             .         Review of Systems:   Review of Systems  Skin: negative except as above  Respiratory: negative except as above  Cardiovascular: negative except as above  Gastrointestinal: negative except as above  Genitourinary: negative except as above  Musculoskeletal: negative except as above  Neurologic: negative except as above  Psychiatric: negative except as above  Hematologic/Lymphatic/Immunologic: negative except as above  Endocrine: negative except as above         Physical Exam:     Vitals:    05/30/19 1640   BP: 120/76   Pulse: 68   Resp: 16   Temp: 97.9  F (36.6  C)   TempSrc: Oral   SpO2: 98%     There is no height or weight on file to calculate BMI.  Vitals were reviewed and were normal     Physical Exam  Constitutional: Oriented to person, place, and time. Appears well-developed and well-nourished.   HENT:   Head: Normocephalic and atraumatic.   Eyes: Conjunctivae are normal.   Neck: Normal range of motion.   Musculoskeletal: Normal range of motion.   Neurological: Alert and oriented to person, place, and time.   Skin: Skin is warm and dry.   Psychiatric: Has a normal mood and affect. Behavior is normal.       Results:   Results from last visit:  Orders Only on 05/24/2019   Component Date Value Ref Range Status     TSH 05/24/2019 2.32  0.40 - 4.00 mU/L Final     Hemoglobin A1C 05/24/2019 5.5  4.1 - 5.7 % Final     WBC 05/24/2019 4.4  4.0 - 11.0 K/uL Final     RBC 05/24/2019 4.40  3.80 - 5.20 M/uL Final     Hemoglobin 05/24/2019 9.3* 11.7 - 15.7 g/dL Final     Hematocrit 05/24/2019 32.1* 35.0 - 47.0 % Final     MCV 05/24/2019 72.9* 78.0 - 100.0 fL Final     MCH 05/24/2019 21.1* 26.5 - 35.0 pg Final     MCHC 05/24/2019 29.0* 32.0 - 36.0 g/dL Final     Platelets 05/24/2019 235.0  150.0 - 450.0 K/uL Final     Albumin 05/24/2019 4.0  3.8 - 5.0 mg/dL Final     Alkaline Phosphatase 05/24/2019 45.0  31.7 - 110.5 U/L Final     ALT 05/24/2019 11.2  0.0 - 45.0 U/L Final     AST 05/24/2019 10.3  0.0 - 45.0 U/L Final      Bilirubin Total 05/24/2019 <0.4  0.2 - 1.3 mg/dL Final     Urea Nitrogen 05/24/2019 8.4  7.0 - 19.0 mg/dL Final     Calcium 05/24/2019 8.8  8.5 - 10.1 mg/dL Final     Chloride 05/24/2019 100.7  98.0 - 110.0 mmol/L Final     Carbon Dioxide 05/24/2019 24.6  20.0 - 32.0 mmol/L Final     Creatinine 05/24/2019 0.5  0.5 - 1.0 mg/dL Final     Glucose 05/24/2019 108.5* 70.0 - 99.0 mg'dL Final     Potassium 05/24/2019 3.8  3.3 - 4.5 mmol/dL Final     Sodium 05/24/2019 136.7  132.6 - 141.4 mmol/L Final     Protein Total 05/24/2019 7.1  6.8 - 8.8 g/dL Final     GFR Estimate 05/24/2019 >90  >60.0 mL/min/1.7 m2 Final     GFR Estimate If Black 05/24/2019 >90  >60.0 mL/min/1.7 m2 Final     Cholesterol 05/24/2019 174.8  0.0 - 200.0 mg/dL Final     Cholesterol/HDL Ratio 05/24/2019 3.3  0.0 - 5.0 Final     HDL Cholesterol 05/24/2019 52.5  >40.0 mg/dL Final     LDL Cholesterol Calculated 05/24/2019 111  0 - 129 mg/dL Final     Triglycerides 05/24/2019 55.5  0.0 - 150.0 mg/dL Final     VLDL Cholesterol 05/24/2019 11.1  7.0 - 32.0 mg/dL Final     Quantiferon-TB Gold Plus Result 05/24/2019 Negative  NEG^Negative Final    Comment: No interferon gamma response to M.tuberculosis antigens was detected.   Infection with M.tuberculosis is unlikely, however a single negative result   does not exclude infection. In patients at high risk for infection, a second   test should be considered  in accordance with the 2017 ATS/IDSA/CDC Clinical Practice Guidelines for   Diagnosis of Tuberculosis in Adults and Children [Diana SMITH et   al.Clin.Infect.Dis. 2017 64(2):111-115].       TB1 Ag minus Nil Value 05/24/2019 0.07  IU/mL Final     TB2 Ag minus Nil Value 05/24/2019 0.04  IU/mL Final     Mitogen minus Nil Result 05/24/2019 >10.00  IU/mL Final     Nil Result 05/24/2019 0.05  IU/mL Final       Assessment and Plan        1.  Fatigue likely related to iron deficiency anemia   Given the fact that she has previously been worked up for iron  deficiency anemia and was noted to have a low ferritin, I did not feel the need to repeat an iron panel at this time.  She has previously been on iron supplementation and has recently discontinued that.  It appears her fluctuation in baseline hemoglobin levels range from 9-10 and are based solely on where she is in her menstrual cycle.  At this time I advised her to avoid using any antacids including omeprazole or Zantac as this is been linked to iron deficiency.  I also advised her to take iron tablets with some form of acid.  -Planning to increase activity  -Other lifestyle changes include limiting sugar intake    Please call or return to clinic if your symptoms don't go away.    Follow up plan  Please make a clinic appointment for follow up with your primary physician Lauren Ferrell MD as needed.     Thank you for coming to [facility] today.     There are no discontinued medications.    Options for treatment and follow-up care were reviewed with the patient. Sakina Banuelos  engaged in the decision making process and verbalized understanding of the options discussed and agreed with the final plan.    Chanell Chaidez MD

## 2019-06-14 ENCOUNTER — HOSPITAL ENCOUNTER (EMERGENCY)
Facility: CLINIC | Age: 54
Discharge: HOME | End: 2019-06-14
Attending: EMERGENCY MEDICINE | Admitting: EMERGENCY MEDICINE
Payer: COMMERCIAL

## 2019-06-14 VITALS
RESPIRATION RATE: 16 BRPM | TEMPERATURE: 97.6 F | SYSTOLIC BLOOD PRESSURE: 144 MMHG | OXYGEN SATURATION: 100 % | DIASTOLIC BLOOD PRESSURE: 93 MMHG | HEART RATE: 64 BPM

## 2019-06-14 DIAGNOSIS — N30.01 ACUTE CYSTITIS WITH HEMATURIA: ICD-10-CM

## 2019-06-14 LAB
ALBUMIN UR-MCNC: 10 MG/DL
APPEARANCE UR: CLEAR
BILIRUB UR QL STRIP: NEGATIVE
COLOR UR AUTO: ABNORMAL
GLUCOSE UR STRIP-MCNC: NEGATIVE MG/DL
HCG UR QL: NEGATIVE
HGB UR QL STRIP: ABNORMAL
INTERNAL QC OK POCT: YES
KETONES UR STRIP-MCNC: NEGATIVE MG/DL
LEUKOCYTE ESTERASE UR QL STRIP: ABNORMAL
NITRATE UR QL: NEGATIVE
PH UR STRIP: 7 PH (ref 5–7)
RBC #/AREA URNS AUTO: <1 /HPF (ref 0–2)
SOURCE: ABNORMAL
SP GR UR STRIP: 1 (ref 1–1.03)
UROBILINOGEN UR STRIP-MCNC: NORMAL MG/DL (ref 0–2)
WBC #/AREA URNS AUTO: 23 /HPF (ref 0–5)

## 2019-06-14 PROCEDURE — 87088 URINE BACTERIA CULTURE: CPT | Performed by: EMERGENCY MEDICINE

## 2019-06-14 PROCEDURE — 99284 EMERGENCY DEPT VISIT MOD MDM: CPT | Mod: Z6 | Performed by: EMERGENCY MEDICINE

## 2019-06-14 PROCEDURE — 99284 EMERGENCY DEPT VISIT MOD MDM: CPT | Performed by: EMERGENCY MEDICINE

## 2019-06-14 PROCEDURE — 87186 SC STD MICRODIL/AGAR DIL: CPT | Performed by: EMERGENCY MEDICINE

## 2019-06-14 PROCEDURE — 81025 URINE PREGNANCY TEST: CPT | Performed by: EMERGENCY MEDICINE

## 2019-06-14 PROCEDURE — 87086 URINE CULTURE/COLONY COUNT: CPT | Performed by: EMERGENCY MEDICINE

## 2019-06-14 PROCEDURE — 81001 URINALYSIS AUTO W/SCOPE: CPT | Performed by: EMERGENCY MEDICINE

## 2019-06-14 PROCEDURE — 25000132 ZZH RX MED GY IP 250 OP 250 PS 637: Performed by: EMERGENCY MEDICINE

## 2019-06-14 RX ORDER — IBUPROFEN 600 MG/1
600 TABLET, FILM COATED ORAL ONCE
Status: COMPLETED | OUTPATIENT
Start: 2019-06-14 | End: 2019-06-14

## 2019-06-14 RX ORDER — PHENAZOPYRIDINE HYDROCHLORIDE 200 MG/1
200 TABLET, FILM COATED ORAL 3 TIMES DAILY
Qty: 6 TABLET | Refills: 0 | Status: SHIPPED | OUTPATIENT
Start: 2019-06-14 | End: 2019-11-15

## 2019-06-14 RX ORDER — SULFAMETHOXAZOLE/TRIMETHOPRIM 800-160 MG
1 TABLET ORAL 2 TIMES DAILY
Qty: 10 TABLET | Refills: 0 | Status: SHIPPED | OUTPATIENT
Start: 2019-06-14 | End: 2019-06-16

## 2019-06-14 RX ADMIN — IBUPROFEN 600 MG: 600 TABLET ORAL at 05:10

## 2019-06-14 ASSESSMENT — ENCOUNTER SYMPTOMS
CONFUSION: 0
WEAKNESS: 0
BRUISES/BLEEDS EASILY: 0
CHILLS: 0
FEVER: 0
ABDOMINAL PAIN: 1
BACK PAIN: 0
SHORTNESS OF BREATH: 0
FLANK PAIN: 0
HEMATURIA: 1
DYSURIA: 1

## 2019-06-14 NOTE — ED PROVIDER NOTES
History     Chief Complaint   Patient presents with     Hematuria     dysruia & hematuria began tonight     HPI  Sakina Banuelos is a 54 year old female who has no significant past medical history who presents emergency department from home with a complaint of dysuria and hematuria.  Patient reports that she has had some lower abdominal/bladder discomfort over the past few days.  Patient states that tonight she was unable to sleep and complains of some dysuria, urgency, and hematuria.  Patient reports she is currently sexually active and reports that she last had intercourse this evening.  Patient denies any fever, chills, nausea, vomiting, chest pain, shortness of breath, flank pain, vaginal bleeding, vaginal discharge.  Last menstrual period was 5/18/2019.  Patient reports she gets normal menses.  No other complaints.    I have reviewed the Medications, Allergies, Past Medical and Surgical History, and Social History in the Epic system.    Review of Systems   Constitutional: Negative for chills and fever.   Respiratory: Negative for shortness of breath.    Cardiovascular: Negative for chest pain.   Gastrointestinal: Positive for abdominal pain.   Genitourinary: Positive for dysuria and hematuria. Negative for flank pain, vaginal bleeding and vaginal discharge.   Musculoskeletal: Negative for back pain.   Skin: Negative for rash.   Allergic/Immunologic: Negative for immunocompromised state.   Neurological: Negative for weakness.   Hematological: Does not bruise/bleed easily.   Psychiatric/Behavioral: Negative for confusion.       Physical Exam   BP: 146/82  Heart Rate: 68  Temp: 97.4  F (36.3  C)  Resp: 20  Weight: (pt refused to be weighed)  SpO2: 97 %      Physical Exam   Constitutional: She is oriented to person, place, and time. She appears well-developed. No distress.   HENT:   Head: Normocephalic and atraumatic.   Right Ear: External ear normal.   Left Ear: External ear normal.   Mouth/Throat: Oropharynx is  clear and moist.   Eyes: Pupils are equal, round, and reactive to light. Conjunctivae and EOM are normal.   Neck: Normal range of motion. Neck supple.   Cardiovascular: Normal rate, regular rhythm and normal heart sounds.   Pulmonary/Chest: Effort normal and breath sounds normal. No respiratory distress. She has no wheezes. She has no rales.   Abdominal: Soft. She exhibits no distension. There is tenderness (Mild tenderness to palpation suprapubic region with no rebound, no guarding, no peritoneal signs). There is no rebound and no guarding.   Musculoskeletal: Normal range of motion. She exhibits no tenderness or deformity.   Neurological: She is alert and oriented to person, place, and time. No cranial nerve deficit. Coordination normal.   Skin: Skin is warm and dry. No rash noted.   Psychiatric: She has a normal mood and affect. Her behavior is normal.       ED Course        Procedures         .  Results for orders placed or performed during the hospital encounter of 06/14/19   UA with Microscopic   Result Value Ref Range    Color Urine Straw     Appearance Urine Clear     Glucose Urine Negative NEG^Negative mg/dL    Bilirubin Urine Negative NEG^Negative    Ketones Urine Negative NEG^Negative mg/dL    Specific Gravity Urine 1.001 (L) 1.003 - 1.035    Blood Urine Moderate (A) NEG^Negative    pH Urine 7.0 5.0 - 7.0 pH    Protein Albumin Urine 10 (A) NEG^Negative mg/dL    Urobilinogen mg/dL Normal 0.0 - 2.0 mg/dL    Nitrite Urine Negative NEG^Negative    Leukocyte Esterase Urine Large (A) NEG^Negative    Source Midstream Urine     WBC Urine 23 (H) 0 - 5 /HPF    RBC Urine <1 0 - 2 /HPF   hCG qual urine POCT   Result Value Ref Range    HCG Qual Urine Negative neg    Internal QC OK Yes        Assessments & Plan (with Medical Decision Making)   Sakina Banuelos is a 54 year old female who has no significant past medical history who presents emergency department from home with a complaint of dysuria and hematuria.  Upon  arrival patient is well-appearing, afebrile, mild distress secondary to dysuria and urgency.  Abdomen is soft, mild tenderness to palpation in the suprapubic region with no rebound, no guarding, no peritoneal signs.  Likely cystitis.  Urine pregnancy negative.  I reviewed urinalysis which is remarkable for leukoesterase, 23 point blood cells, moderate amount of blood with less than 1 RBCs.  Clinical picture is most consistent with acute cystitis.  At this time plan for discharge home with continue supportive care, hydration, Pyridium, Bactrim, and recommend close follow-up with her primary care provider.  Return precautions discussed.  Patient understands and agrees with the plan. .The patient is discharged home with instructions to return if their symptoms persist or worsen.  Plan for close follow-up with their primary physician.  I discussed workup, results, treatment, and plan with the patient.  Patient understands and agrees with the plan.      I have reviewed the nursing notes.    I have reviewed the findings, diagnosis, plan and need for follow up with the patient.       Medication List      Started    phenazopyridine 200 MG tablet  Commonly known as:  PYRIDIUM  200 mg, Oral, 3 TIMES DAILY     sulfamethoxazole-trimethoprim 800-160 MG tablet  Commonly known as:  BACTRIM DS  1 tablet, Oral, 2 TIMES DAILY            Final diagnoses:   Acute cystitis with hematuria       6/14/2019   [facility], [place], EMERGENCY DEPARTMENT     Juana Rod MD  06/14/19 5511

## 2019-06-14 NOTE — DISCHARGE INSTRUCTIONS
Thank you for your patience today. Please follow up with your regular doctor in 2-3 days for further evaluation. Please call to make an appointment. Please continue your own medications. Please take antibiotics as prescribed. Please drink plenty of water and cranberry juice.  Please do not have any sexual intercourse for the next 7 to 10 days.  Please return to the ER if you develop high fever, persistent vomiting, severe pain, or any worsening of your currents symptoms. It was a pleasure taking care of you today. Thank you. We hope you feel better soon

## 2019-06-15 LAB
BACTERIA SPEC CULT: ABNORMAL
Lab: ABNORMAL
SPECIMEN SOURCE: ABNORMAL

## 2019-07-11 DIAGNOSIS — Z87.898 HISTORY OF WHEEZING: ICD-10-CM

## 2019-07-11 DIAGNOSIS — Z00.00 ROUTINE GENERAL MEDICAL EXAMINATION AT A HEALTH CARE FACILITY: Primary | ICD-10-CM

## 2019-07-11 RX ORDER — PRENATAL VIT/IRON FUM/FOLIC AC 27MG-0.8MG
1 TABLET ORAL DAILY
Qty: 60 TABLET | Refills: 11 | Status: SHIPPED | OUTPATIENT
Start: 2019-07-11 | End: 2020-03-27

## 2019-07-11 RX ORDER — LORATADINE 10 MG/1
10 TABLET ORAL DAILY
Qty: 60 TABLET | Refills: 3 | Status: SHIPPED | OUTPATIENT
Start: 2019-07-11 | End: 2020-03-27

## 2019-07-11 RX ORDER — LORATADINE 10 MG/1
TABLET ORAL
Qty: 60 TABLET | Refills: 3 | Status: SHIPPED | OUTPATIENT
Start: 2019-07-11 | End: 2019-11-15

## 2019-07-16 ENCOUNTER — ANCILLARY PROCEDURE (OUTPATIENT)
Dept: MAMMOGRAPHY | Facility: CLINIC | Age: 54
End: 2019-07-16
Attending: FAMILY MEDICINE
Payer: COMMERCIAL

## 2019-07-16 DIAGNOSIS — Z12.31 VISIT FOR SCREENING MAMMOGRAM: ICD-10-CM

## 2019-07-16 PROCEDURE — 77067 SCR MAMMO BI INCL CAD: CPT

## 2019-08-23 DIAGNOSIS — R35.0 FREQUENT URINATION: Primary | ICD-10-CM

## 2019-08-23 LAB
BACTERIA: NORMAL
BILIRUBIN UR: NEGATIVE
BLOOD UR: NEGATIVE
CASTS: NORMAL /LPF
CRYSTAL URINE: NORMAL /LPF
EPITHELIAL CELLS UR: NORMAL /LPF (ref 0–2)
GLUCOSE URINE: NEGATIVE
KETONES UR QL: NEGATIVE
LEUKOCYTE ESTERASE UR: ABNORMAL
MUCOUS URINE: NORMAL LPF
NITRITE UR QL STRIP: NEGATIVE
PH UR STRIP: 6 [PH] (ref 4.5–8)
PROTEIN UR: NEGATIVE
RBC URINE: NORMAL /HPF
SP GR UR STRIP: 1.02 (ref 1–1.03)
UROBILINOGEN UR STRIP-ACNC: ABNORMAL
WBC URINE: NORMAL /HPF

## 2019-09-05 ENCOUNTER — OFFICE VISIT (OUTPATIENT)
Dept: FAMILY MEDICINE | Facility: CLINIC | Age: 54
End: 2019-09-05
Payer: COMMERCIAL

## 2019-09-05 VITALS
TEMPERATURE: 98.1 F | DIASTOLIC BLOOD PRESSURE: 84 MMHG | RESPIRATION RATE: 16 BRPM | SYSTOLIC BLOOD PRESSURE: 122 MMHG | HEART RATE: 68 BPM | OXYGEN SATURATION: 100 %

## 2019-09-05 DIAGNOSIS — Z98.890 HISTORY OF MYOMECTOMY: ICD-10-CM

## 2019-09-05 DIAGNOSIS — Z72.51 UNPROTECTED SEXUAL INTERCOURSE: ICD-10-CM

## 2019-09-05 DIAGNOSIS — Z13.9 SCREENING FOR CONDITION: Primary | ICD-10-CM

## 2019-09-05 DIAGNOSIS — N91.2 AMENORRHEA: ICD-10-CM

## 2019-09-05 LAB
BILIRUBIN UR: NEGATIVE
BLOOD UR: NEGATIVE
GLUCOSE URINE: NEGATIVE
HCG UR QL: NEGATIVE
KETONES UR QL: NEGATIVE
LEUKOCYTE ESTERASE UR: NEGATIVE
NITRITE UR QL STRIP: NEGATIVE
PH UR STRIP: 6.5 [PH] (ref 4.5–8)
PROTEIN UR: NEGATIVE
SP GR UR STRIP: 1.02 (ref 1–1.03)
UROBILINOGEN UR STRIP-ACNC: NORMAL

## 2019-09-05 NOTE — PROGRESS NOTES
HPI       Sakina Banuelos is a 54 year old  who presents for   Chief Complaint   Patient presents with     Pap          Pregnancy Test     UTI - treated     True age approx 42. Works as  here.     Scheduled herself for a screening pap. Reports new sexual partner x 5 months, frequent unprotected intercourse. Desires pregnancy, but has struggled to conceive previously- brief chart review includes problem of primary infertility dating back to 2008. Seems to feel that her infertility might not be an issue with a new partner.    Irregular periods, none in last 2 months. H/o uterine fibroids and myomectomy in 2013.   Recent UTI, s/p abx x 2. Those symptoms have resolved.   Today reports breast tenderness, pelvic discomfort cramping - like before period.     +++++++  Problem, Medication and Allergy Lists were reviewed.    Patient is an established patient of this clinic.         Review of Systems:   Review of Systems   Constitutional: Negative.    Respiratory: Negative.    Cardiovascular: Negative.    Gastrointestinal: Negative for anal bleeding, blood in stool, constipation, diarrhea, nausea and vomiting.   Endocrine: Negative.    Genitourinary: Positive for menstrual problem. Negative for dyspareunia, dysuria, flank pain, frequency, genital sores, hematuria, urgency, vaginal discharge and vaginal pain.   Musculoskeletal: Negative.    Neurological: Negative.    Hematological: Negative.    Psychiatric/Behavioral: Negative.             Physical Exam:     Vitals:    09/05/19 1638   BP: 122/84   Pulse: 68   Resp: 16   Temp: 98.1  F (36.7  C)   TempSrc: Oral   SpO2: 100%     There is no height or weight on file to calculate BMI.  itals were reviewed      Physical Exam   Constitutional: She appears well-developed. No distress.   Overweight   Abdominal: Soft. Bowel sounds are normal. She exhibits no distension and no mass. There is tenderness. There is no rebound and no guarding.   Mild B lower abd discomfort. No  suprapubic pain. No CVA ttp.   Genitourinary:   Genitourinary Comments: Gr 2 circ. Accessible introitus. Tight vaginal muscles. Vagina well rugated, no atrophy. Nulparous cervix. Scant discharge without odor. Pap and cultures obtained.         Results:      Results from this visit  Results for orders placed or performed in visit on 09/05/19   Urinalysis, Micro If (UA) ([facility])   Result Value Ref Range    Specific Gravity Urine 1.025 1.005 - 1.030    pH Urine 6.5 4.5 - 8.0    Leukocyte Esterase UR Negative NEGATIVE    Nitrite Urine Negative NEGATIVE    Protein UR Negative NEGATIVE    Glucose Urine Negative NEGATIVE    Ketones Urine Negative NEGATIVE    Urobilinogen mg/dL 0.2 E.U./dL 0.2 E.U./dL    Bilirubin UR Negative NEGATIVE    Blood UR Negative NEGATIVE   HCG Qualitative Urine (UPT) ([facility])   Result Value Ref Range    HCG Qual Urine NEGATIVE Negative   Wet Prep ([facility])   Result Value Ref Range    Yeast Wet Prep None none    Motile Trichomonas Wet Prep Negative Negative    Clue Cells Wet Prep None NONE    WBC WET PREP None 2 - 5    Bacteria Wet Prep Moderate None    pH Wet Prep <4.5 3.8 - 4.5    Odor Wet Prep None NONE   Chlamydia trachomatis PCR   Result Value Ref Range    Specimen Description Vagina     Chlamydia Trachomatis PCR Negative NEG^Negative   Neisseria gonorrhoeae PCR   Result Value Ref Range    Specimen Descrip Vagina     N Gonorrhea PCR Negative NEG^Negative       Assessment and Plan        Sakina was seen today for abdominal pain, pregnancy test and uti.    Diagnoses and all orders for this visit:    Amenorrhea  History of uterinefibroids and myomectomy  History of primary infertility per record. Pt not convinced?  Desires pregnancy         -     HCG Qualitative Urine (UPT) ([facility])  -     Request US Pelvic Complete w Transvaginal; Future - to reassess for fibroids  -     Advise return to Gyn for infertility cares if pregnancy is goal    Unprotected sexual intercourse  Pelvic discomfort         -     Urinalysis, Micro If (UA) ([facility])  -     Chlamydia trachomatis PCR  -     Neisseria gonorrhoeae PCR  -     Wet Prep ([facility])  All normal/reassuing - suspect period is impending    Screening for condition  -     Pap imaged thin layer screen with HPV - recommended age 30 - 65 years (select HPV order below)  -     HPV High Risk Types DNA Cervical     There are no discontinued medications.    Options for treatment and follow-up care were reviewed with the patient. Sakina Banuelos  engaged in the decision making process and verbalized understanding of the options discussed and agreed with the final plan.    Padmini Lee MD

## 2019-09-06 ENCOUNTER — HOSPITAL ENCOUNTER (OUTPATIENT)
Dept: ULTRASOUND IMAGING | Facility: CLINIC | Age: 54
Discharge: HOME | End: 2019-09-06
Attending: FAMILY MEDICINE | Admitting: FAMILY MEDICINE
Payer: COMMERCIAL

## 2019-09-06 DIAGNOSIS — Z98.890 HISTORY OF MYOMECTOMY: ICD-10-CM

## 2019-09-06 DIAGNOSIS — N91.2 AMENORRHEA: ICD-10-CM

## 2019-09-06 LAB
BACTERIA: NORMAL
CLUE CELLS: NORMAL
MOTILE TRICHOMONAS: NEGATIVE
ODOR: NORMAL
PH WET PREP: <4.5 (ref 3.8–4.5)
WBC WET PREP: NORMAL (ref 2–5)
YEAST: NORMAL

## 2019-09-06 PROCEDURE — 76830 TRANSVAGINAL US NON-OB: CPT

## 2019-09-10 LAB
C TRACH DNA SPEC QL NAA+PROBE: NEGATIVE
N GONORRHOEA DNA SPEC QL NAA+PROBE: NEGATIVE
SPECIMEN SOURCE: NORMAL
SPECIMEN SOURCE: NORMAL

## 2019-09-11 LAB
COPATH REPORT: NORMAL
PAP: NORMAL

## 2019-09-11 ASSESSMENT — ENCOUNTER SYMPTOMS
FLANK PAIN: 0
CARDIOVASCULAR NEGATIVE: 1
MUSCULOSKELETAL NEGATIVE: 1
HEMATOLOGIC/LYMPHATIC NEGATIVE: 1
HEMATURIA: 0
NAUSEA: 0
DIARRHEA: 0
CONSTITUTIONAL NEGATIVE: 1
ENDOCRINE NEGATIVE: 1
VOMITING: 0
ANAL BLEEDING: 0
RESPIRATORY NEGATIVE: 1
CONSTIPATION: 0
PSYCHIATRIC NEGATIVE: 1
DYSURIA: 0
NEUROLOGICAL NEGATIVE: 1
FREQUENCY: 0
BLOOD IN STOOL: 0

## 2019-09-12 LAB
FINAL DIAGNOSIS: NORMAL
HPV HR 12 DNA CVX QL NAA+PROBE: NEGATIVE
HPV16 DNA SPEC QL NAA+PROBE: NEGATIVE
HPV18 DNA SPEC QL NAA+PROBE: NEGATIVE
SPECIMEN DESCRIPTION: NORMAL
SPECIMEN SOURCE CVX/VAG CYTO: NORMAL

## 2019-10-31 DIAGNOSIS — K21.9 GASTROESOPHAGEAL REFLUX DISEASE WITHOUT ESOPHAGITIS: ICD-10-CM

## 2019-10-31 RX ORDER — NICOTINE POLACRILEX 4 MG/1
20 GUM, CHEWING ORAL DAILY
Qty: 90 TABLET | Refills: 0 | Status: SHIPPED | OUTPATIENT
Start: 2019-10-31 | End: 2019-11-04

## 2019-10-31 NOTE — TELEPHONE ENCOUNTER
Received refill request for omeprazole 20 MG tablet, Patient was last seen in clinic on 09/05/2019, request passed clinic refill protocol. Order pended and routed to PCP to accommodate 90 day supply request, will continue to follow up.    Marlee Franklin RN

## 2019-11-04 DIAGNOSIS — K21.9 GASTROESOPHAGEAL REFLUX DISEASE WITHOUT ESOPHAGITIS: ICD-10-CM

## 2019-11-04 RX ORDER — NICOTINE POLACRILEX 4 MG/1
20 GUM, CHEWING ORAL DAILY
Qty: 90 TABLET | Refills: 0 | Status: SHIPPED | OUTPATIENT
Start: 2019-11-04 | End: 2020-03-27

## 2019-11-15 ENCOUNTER — OFFICE VISIT (OUTPATIENT)
Dept: FAMILY MEDICINE | Facility: CLINIC | Age: 54
End: 2019-11-15
Payer: COMMERCIAL

## 2019-11-15 VITALS
OXYGEN SATURATION: 100 % | TEMPERATURE: 98.2 F | SYSTOLIC BLOOD PRESSURE: 115 MMHG | DIASTOLIC BLOOD PRESSURE: 81 MMHG | HEART RATE: 82 BPM

## 2019-11-15 DIAGNOSIS — R10.30 LOWER ABDOMINAL PAIN: Primary | ICD-10-CM

## 2019-11-15 DIAGNOSIS — Z32.02 PREGNANCY EXAMINATION OR TEST, NEGATIVE RESULT: ICD-10-CM

## 2019-11-15 LAB
BILIRUBIN UR: NEGATIVE MG/DL
BLOOD UR: NEGATIVE MG/DL
GLUCOSE URINE: NEGATIVE
HCG UR QL: NEGATIVE
KETONES UR QL: NEGATIVE MG/DL
LEUKOCYTE ESTERASE UR: NEGATIVE
NITRITE UR QL STRIP: NEGATIVE MG/DL
PH UR STRIP: 5.5 [PH] (ref 4.5–8)
PROTEIN UR: NEGATIVE MG/DL
SP GR UR STRIP: >=1.03 (ref 1–1.03)
UROBILINOGEN UR STRIP-ACNC: NORMAL E.U./DL

## 2019-11-15 RX ORDER — SULFAMETHOXAZOLE/TRIMETHOPRIM 800-160 MG
1 TABLET ORAL 2 TIMES DAILY
Qty: 6 TABLET | Refills: 0 | Status: SHIPPED | OUTPATIENT
Start: 2019-11-15 | End: 2020-01-13

## 2019-11-15 ASSESSMENT — ENCOUNTER SYMPTOMS
DIFFICULTY URINATING: 0
ABDOMINAL PAIN: 1
FEVER: 0
HEMATURIA: 0
DYSURIA: 0
CONSTIPATION: 0
DIARRHEA: 0
FLANK PAIN: 0
CHILLS: 0

## 2019-11-15 NOTE — PROGRESS NOTES
HPI       Sakina Banuelos is a 54 year old who presents for suprapubic pain. Feels like the beginning of UTI. Has a history of UTI that feels similar.     Urinary Tract Symptoms  Onset: three days    Description:   Painful urination (Dysuria): no  Frequent urination:  no  Need to urinate urgently: no  Blood in urine (Hematuria):no  Delay in urine (Hesitency): mild    Intensity: mild    Progression of Symptoms:  worsening    Accompanying Signs & Symptoms:  Fever/chills: no  Flank pain no  Nausea and vomiting: Yes Details: mild nausea without vomiting.   Any vaginal discharge/symptoms: {no  Abdominal/Pelvic Pain: Yes Details: suprapubic pain    History:   History of frequent UTI's: no  History of kidney stones: no  Sexually Active: Yes Details: one partner  Possibility of pregnancy:Yes Details: still has menstural periods and not using any form of birth control    What makes it worse?: drinking lots of water    Therapies Tried and outcome: none      Problem, Medication and Allergy Lists      Patient Active Problem List    Diagnosis Date Noted     Iron deficiency anemia  03/08/2018     Priority: Medium     S/P myomectomy 01/23/2013     Priority: Medium     PCOS (polycystic ovarian syndrome) 12/05/2012     Priority: Medium     Health Care Home 10/02/2012     Priority: Medium     Tier 1    DX V65.8 REPLACED WITH 70438 HEALTH CARE HOME (04/08/2013)       Vitamin D deficiency 10/02/2012     Priority: Medium     Problem list name updated by automated process. Provider to review       Screening for malignant neoplasm of cervix 10/02/2012     Priority: Medium     Problem list name updated by automated process. Provider to review       Dyspareunia 10/02/2012     Priority: Medium     Irregular menstrual cycle 10/02/2012     Priority: Medium     Irregular length of menstrual periods  Menstrual cycle intervals irregularly irregular       Obesity 10/02/2012     Priority: Medium     Problem list name updated by automated  process. Provider to review       Vaginal leukorrhea 10/02/2012     Priority: Medium     Problem list name updated by automated process. Provider to review     ,     Current Outpatient Medications   Medication Sig Dispense Refill     acetaminophen (TYLENOL) 325 MG tablet Take 2 tablets (650 mg) by mouth every 6 hours as needed for mild pain 100 tablet 0     Cholecalciferol (VITAMIN D3) 2000 units CAPS Take 1 tablet by mouth daily 30 capsule 11     lidocaine (LMX4) 4 % external cream Apply 1 g topically 2 times daily as needed for mild pain 45 g 3     lidocaine-prilocaine (EMLA) 2.5-2.5 % external cream Apply topically as needed for moderate pain 30 g 3     loratadine (CLARITIN) 10 MG tablet Take 1 tablet (10 mg) by mouth daily 60 tablet 3     omeprazole 20 MG tablet Take 1 tablet (20 mg) by mouth daily Take 30-60 minutes before a meal. 90 tablet 0     Prenatal Vit-Fe Fumarate-FA (PRENATAL MULTIVITAMIN W/IRON) 27-0.8 MG tablet Take 1 tablet by mouth daily 60 tablet 11     sulfamethoxazole-trimethoprim (BACTRIM DS/SEPTRA DS) 800-160 MG tablet Take 1 tablet by mouth 2 times daily for 3 days 6 tablet 0   ,   No Known Allergies.    Patient is an established patient of this clinic..         Review of Systems:   Review of Systems   Constitutional: Negative for chills and fever.   Gastrointestinal: Positive for abdominal pain (lower). Negative for constipation and diarrhea.   Genitourinary: Negative for difficulty urinating, dysuria, flank pain, hematuria, urgency, vaginal discharge and vaginal pain.            Physical Exam:     Vitals:    11/15/19 1542   BP: 115/81   Pulse: 82   Temp: 98.2  F (36.8  C)   TempSrc: Oral   SpO2: 100%     There is no height or weight on file to calculate BMI.  Vitals were reviewed and were normal     Physical Exam  General: Alert and oriented, in no acute distress.  Skin: Warm and dry, no abnormalities noted.  Eyes: Extra-ocular muscles intact, pupils equal and reactive.  ENT: Speech intact,  nasal passages open, no hearing impairment noted.  CV: No cyanosis or pallor, warm and well perfused.  Respiratory: No respiratory distress, no accessory muscle use.  Abdominal: soft, mild suprapubic tenderness. No rebound or guarding.   Neuro: Gait and station normal, comprehension intact. Gross and fine motor skills intact.   Psychiatric: Mood and affect appear normal.   Extremities: Warm, able to move all four extremities at will.      Results:      Results from this visit  Results for orders placed or performed in visit on 11/15/19   Urinalysis, Micro If (UA) ([facility])     Status: None   Result Value Ref Range    Specific Gravity Urine >=1.030 1.005 - 1.030    pH Urine 5.5 4.5 - 8.0    Leukocyte Esterase UR Negative NEGATIVE    Nitrite Urine Negative NEGATIVE mg/dL    Protein UR Negative NEGATIVE mg/dL    Glucose Urine Negative NEGATIVE    Ketones Urine Negative NEGATIVE mg/dL    Urobilinogen mg/dL 0.2 E.U./dL 0.2 E.U./dL E.U./dL    Bilirubin UR Negative NEGATIVE mg/dL    Blood UR Negative NEGATIVE mg/dL   HCG Qualitative Urine (UPT) ([facility])     Status: None   Result Value Ref Range    HCG Qual Urine NEGATIVE Negative       Assessment and Plan      Sakina was seen today for lower abdominal pain and concern for UTI    Diagnoses and all orders for this visit:    Lower abdominal pain  Three days of mild suprapubic pain without urinary symptoms. Reports a history of UTI with same presentation. UA shows no signs of infection. She is still having menses, sexually active with 1 male partner and not using any form of birth control so a urine pregnancy was ordered and resulted negative. Patient had a pelvic US back in September for amenorrhea, but ending up getting menses two days after. She may be at or approaching perimenopausal period. She has no issues w/ vaginal dryness to indicate estrogen therapy. Urine culture was ordered. Patient given bactrim for a wait and see approach if symptoms persist.   -      Urinalysis, Micro If (UA) ([facility])  -     HCG Qualitative Urine (UPT) ([facility])  -     Urine Culture Aerobic Bacterial  -     sulfamethoxazole-trimethoprim (BACTRIM DS/SEPTRA DS) 800-160 MG tablet; Take 1 tablet by mouth 2 times daily for 3 days    Pregnancy examination or test, negative result  -     HCG Qualitative Urine (UPT) ([facility])  Negative pregnancy today.          Medications Discontinued During This Encounter   Medication Reason     ciprofloxacin (CIPRO) 500 MG tablet Medication Reconciliation Clean Up     cyclobenzaprine (FLEXERIL) 5 MG tablet Medication Reconciliation Clean Up     hydrocortisone (CORTIZONE-10) 1 % external ointment Medication Reconciliation Clean Up     ibuprofen (ADVIL/MOTRIN) 600 MG tablet Medication Reconciliation Clean Up     mefloquine (LARIAM) 250 MG tablet Medication Reconciliation Clean Up     multivitamin, therapeutic with minerals (MULTI-VITAMIN) TABS tablet Medication Reconciliation Clean Up     nitroFURantoin macrocrystal-monohydrate (MACROBID) 100 MG capsule Medication Reconciliation Clean Up     phenazopyridine (PYRIDIUM) 200 MG tablet Medication Reconciliation Clean Up     loratadine (CLARITIN) 10 MG tablet Medication Reconciliation Clean Up       Options for treatment and follow-up care were reviewed with the patient. Sakina Banuelos  engaged in the decision making process and verbalized understanding of the options discussed and agreed with the final plan.    Cam Kaur, MS3    I, Axel El, was present with the medical student who participated in the service and in the documentation of the note.  I have verified the history and personally performed the physical exam and medical decision making.  I agree with the assessment and plan of care as documented in the note.        Axel El, DO  Family Medicine PGY-3

## 2019-11-17 NOTE — PROGRESS NOTES
Preceptor Attestation:   Patient seen, evaluated and discussed with the resident. I have verified the content of the note, which accurately reflects my assessment of the patient and the plan of care.   Supervising Physician:  Ubaldo Ramachandran MD

## 2019-11-18 ENCOUNTER — TELEPHONE (OUTPATIENT)
Dept: FAMILY MEDICINE | Facility: CLINIC | Age: 54
End: 2019-11-18

## 2019-11-18 DIAGNOSIS — N30.00 ACUTE CYSTITIS WITHOUT HEMATURIA: Primary | ICD-10-CM

## 2019-11-18 LAB
BACTERIA SPEC CULT: ABNORMAL
Lab: ABNORMAL
SPECIMEN SOURCE: ABNORMAL

## 2019-11-18 RX ORDER — NITROFURANTOIN 25; 75 MG/1; MG/1
100 CAPSULE ORAL 2 TIMES DAILY
Qty: 10 CAPSULE | Refills: 0 | Status: SHIPPED | OUTPATIENT
Start: 2019-11-18 | End: 2020-01-13

## 2019-11-18 NOTE — TELEPHONE ENCOUNTER
11/18/2019    Reviewed Urine Culture. Not sensitive to bactrim. Will prescribe Macrobid.     Axel El, DO

## 2019-12-18 ENCOUNTER — TELEPHONE (OUTPATIENT)
Dept: FAMILY MEDICINE | Facility: CLINIC | Age: 54
End: 2019-12-18

## 2019-12-18 NOTE — TELEPHONE ENCOUNTER
Receive refill request for albuterol (ALBUTEROL) 108 (90 BASE) MCG/ACT, Rx-script was last written 2016, notified patient the need to be seen and offered same day clinic appointment, patient declined, request routed to PCP to address/advise if appropriate, will continue to follow up.    Marlee Franklin RN

## 2019-12-18 NOTE — TELEPHONE ENCOUNTER
[facility] phone call message- patient requesting a refill:    Full Medication Name: albuterol (ALBUTEROL) 108 (90 BASE) MCG/ACT     Discontinued [facility]    Pharmacy confirmed as   [facility] [place], [place] - [address]  [facility] [address]  Phone: [phone] Fax: [phone]  : Yes    Additional Comments: Patient out of medication and is having a flair up     OK to leave a message on voice mail? Yes    Primary language: English      needed? No    Call taken on December 18, 2019 at 1:48 PM by Malini Nguyen CMA

## 2019-12-19 NOTE — TELEPHONE ENCOUNTER
"Called patient to notify providers message \"Need clinic visit.  Will not send rx\", MD Ghassan.     Patient verbalized understanding and agreed.    Marlee Franklin RN    "

## 2020-01-13 ENCOUNTER — ALLIED HEALTH/NURSE VISIT (OUTPATIENT)
Dept: FAMILY MEDICINE | Facility: CLINIC | Age: 55
End: 2020-01-13
Payer: COMMERCIAL

## 2020-01-13 DIAGNOSIS — Z23 NEED FOR PROPHYLACTIC VACCINATION AND INOCULATION AGAINST INFLUENZA: Primary | ICD-10-CM

## 2020-01-13 DIAGNOSIS — Z20.828 EXPOSURE TO INFLUENZA: Primary | ICD-10-CM

## 2020-01-13 RX ORDER — OSELTAMIVIR PHOSPHATE 75 MG/1
75 CAPSULE ORAL DAILY
Qty: 10 CAPSULE | Refills: 0 | Status: SHIPPED | OUTPATIENT
Start: 2020-01-13 | End: 2020-01-23

## 2020-02-14 ENCOUNTER — TELEPHONE (OUTPATIENT)
Dept: FAMILY MEDICINE | Facility: CLINIC | Age: 55
End: 2020-02-14

## 2020-02-14 DIAGNOSIS — L01.00 IMPETIGO: ICD-10-CM

## 2020-02-14 DIAGNOSIS — B00.1 COLD SORE: Primary | ICD-10-CM

## 2020-02-14 RX ORDER — MUPIROCIN 20 MG/G
OINTMENT TOPICAL 3 TIMES DAILY
Qty: 1 G | Refills: 0 | Status: SHIPPED | OUTPATIENT
Start: 2020-02-14 | End: 2020-09-17

## 2020-02-14 RX ORDER — VALACYCLOVIR HYDROCHLORIDE 500 MG/1
500 TABLET, FILM COATED ORAL 2 TIMES DAILY
Qty: 6 TABLET | Refills: 0 | Status: SHIPPED | OUTPATIENT
Start: 2020-02-14 | End: 2020-09-17

## 2020-02-14 NOTE — TELEPHONE ENCOUNTER
Pt stopped in clinic- for consult- No same day clinic appointment, per provider request, telephone encounter routed to Dr. Zendejas, as requested.    Marlee Franklin RN

## 2020-02-14 NOTE — TELEPHONE ENCOUNTER
Patient with few blisters on lip and nare. Some honey crusting lesions. Has had cold sores before. Will treat empirically for impetigo and herpes recurrence. Discussed plan with patient.      Samy Zendejas D.O.  [place] [address]  [phone]

## 2020-03-04 ENCOUNTER — ANCILLARY PROCEDURE (OUTPATIENT)
Dept: GENERAL RADIOLOGY | Facility: CLINIC | Age: 55
End: 2020-03-04
Attending: FAMILY MEDICINE
Payer: COMMERCIAL

## 2020-03-04 ENCOUNTER — OFFICE VISIT (OUTPATIENT)
Dept: FAMILY MEDICINE | Facility: CLINIC | Age: 55
End: 2020-03-04
Payer: COMMERCIAL

## 2020-03-04 VITALS
RESPIRATION RATE: 16 BRPM | HEART RATE: 85 BPM | DIASTOLIC BLOOD PRESSURE: 80 MMHG | OXYGEN SATURATION: 100 % | SYSTOLIC BLOOD PRESSURE: 124 MMHG

## 2020-03-04 DIAGNOSIS — Z22.7 LATENT TUBERCULOSIS: Primary | ICD-10-CM

## 2020-03-04 DIAGNOSIS — Z20.1 EXPOSURE TO TB: ICD-10-CM

## 2020-03-04 ASSESSMENT — ENCOUNTER SYMPTOMS
RESPIRATORY NEGATIVE: 1
GASTROINTESTINAL NEGATIVE: 1
FATIGUE: 0
CARDIOVASCULAR NEGATIVE: 1
CHILLS: 0
APPETITE CHANGE: 0
FEVER: 0
ACTIVITY CHANGE: 0

## 2020-03-04 NOTE — PROGRESS NOTES
Preceptor Attestation:   Patient seen, evaluated and discussed with the resident. I have verified the content of the note, which accurately reflects my assessment of the patient and the plan of care.   Supervising Physician:  Jeni Ramirez MD

## 2020-03-04 NOTE — PROGRESS NOTES
Sakina is a 55 year old  who presents for   Patient presents with:  RECHECK: TB screen    Assessment and Plan      1. Latent TB  Patient has a reported history of tuberculosis vaccine in birth control of [facility].  On up arrival to the [place] in the 90[place], tuberculin skin test was done which was positive, and they recommended doing a one-time treatment for latent TB as chest x-ray was normal at that time.  Patient has had repeated chest x-rays documented in our system, no signs of active nor previous primary pulmonary TB seen on x-rays.  Patient's employer is requesting chest x-ray for ongoing management given her work as a .  Chest x-ray reviewed with patient, results also available at the end of visit, radiology read agrees with review in clinic done, showing no signs of active nor previous pulmonary TB.  Provided patient with results, signed copy to provide to employer.  If patient requires further forms or letters, explained that she should reach out to us, happy to complete them.  - XR CHEST 1 VW (+PPD OR PREOP); Future     No follow-ups on file.    There are no discontinued medications.      Carmel Guevara, DO         HPI       Sakina is a 55 year old  who presents for   Patient presents with:  RECHECK: TB screen    HPI:   Positive skin test (had vaccine)  In about 1999 got latent TB treatment  Was given letter saying treatment of latent  [employer]  company says requires CXR now    Foreign born in country with endemic TB? Yes Details: [employer]  Household contact with active TB? no  Immunosuppression (cancer, medications or HIV)? no   Cough:  no  Hemoptesis: no  Fever:  no  Sweats and chills:  no  Anorexia:  no  Weight loss: no  Malaise:  no  Pertinent PMH:  H/O cancer: no  Respiratory Disease: no  Renal disease: no  Liver Disease:{no    Chest XRAY Findings : Normal    Problem, Medication and Allergy Lists were reviewed and updated if needed..  Patient is an established patient of this  clinic.  Reviewed and updated as needed this visit by clinical staff  Tobacco  Allergies  Meds  Med Hx  Surg Hx  Fam Hx  Soc Hx      Reviewed and updated as needed this visit by Provider              Review of Systems:   Review of Systems   Constitutional: Negative for activity change, appetite change, chills, fatigue and fever.   HENT: Negative.    Respiratory: Negative.    Cardiovascular: Negative.    Gastrointestinal: Negative.             Physical Exam:     Vitals:    03/04/20 1630   BP: 124/80   Pulse: 85   Resp: 16   SpO2: 100%     There is no height or weight on file to calculate BMI.  Vitals were reviewed and were normal     Physical Exam  Vitals signs reviewed.   Constitutional:       General: She is not in acute distress.     Appearance: She is not ill-appearing, toxic-appearing or diaphoretic.   Cardiovascular:      Rate and Rhythm: Normal rate and regular rhythm.   Pulmonary:      Effort: Pulmonary effort is normal.      Breath sounds: Normal breath sounds.   Neurological:      Mental Status: She is alert. Mental status is at baseline.   Psychiatric:         Mood and Affect: Mood normal.         Behavior: Behavior normal.         Thought Content: Thought content normal.         Judgment: Judgment normal.           Results:      Results from this visit  Results for orders placed or performed in visit on 03/04/20   XR CHEST 1 VW (+PPD OR PREOP)     Status: None    Narrative    EXAM: XR CHEST 1 VW  3/4/2020 4:29 PM     HISTORY:  Exposure to TB       COMPARISON:  Chest x-ray 7/6/2017    FINDINGS:   Single frontal radiograph of the chest. The trachea is midline.  Cardiac silhouette is within normal limits. The pulmonary vasculature  is distinct. There are no focal airspace opacities. No pleural  effusion or pneumothorax. No acute osseous abnormality.      Impression    IMPRESSION:   No evidence of primary or post primary tuberculosis.    I have personally reviewed the examination and initial  interpretation  and I agree with the findings.    SAFIA DEL CID MD       Options for treatment and follow-up care were reviewed with the patient. Sakina Banuelos  engaged in the decision making process and verbalized understanding of the options discussed and agreed with the final plan.  DO [facility]

## 2020-03-26 ENCOUNTER — TELEPHONE (OUTPATIENT)
Dept: FAMILY MEDICINE | Facility: CLINIC | Age: 55
End: 2020-03-26

## 2020-03-26 NOTE — TELEPHONE ENCOUNTER
[facility] phone call message-patient reporting a symptom:     Symptom: poss yeast infection abd pain     When did symptoms begin? Trevor ladd    Characteristics: (location on body, intensity, what makes it better or worse, associated symptoms):     Additional Details: back pain   abd pain      Same Day Visit Offered: Yes,     Additional comments:pt like to speak with triage nurse regarding  symtom    OK to leave message on voice mail? Yes    Advised patient that RN would call back within 3 hours, unless emergent   Primary language: English      needed? No    Call taken on March 26, 2020 at 1:08 PM by Paul Montiel

## 2020-03-26 NOTE — TELEPHONE ENCOUNTER
"RN spoke with patient who states x 2 days, she has had some frequency with urination, back pain \"where the kidneys are on the sides\", abdominal pain, clear and white vaginal discharge, vaginal itching, and frequent urination. Denies fever, blood in the urine, or foul odor to urine or vaginal discharge, or any other urinary symptoms. RN offered patient a same day visit and she decided tomorrow morning would work better. RN scheduled.  Nieves Cruz RN   "

## 2020-03-27 ENCOUNTER — OFFICE VISIT (OUTPATIENT)
Dept: FAMILY MEDICINE | Facility: CLINIC | Age: 55
End: 2020-03-27
Payer: COMMERCIAL

## 2020-03-27 VITALS
TEMPERATURE: 98.8 F | SYSTOLIC BLOOD PRESSURE: 127 MMHG | DIASTOLIC BLOOD PRESSURE: 82 MMHG | HEART RATE: 82 BPM | OXYGEN SATURATION: 98 % | RESPIRATION RATE: 18 BRPM | BODY MASS INDEX: 37.86 KG/M2 | HEIGHT: 66 IN

## 2020-03-27 DIAGNOSIS — B37.31 YEAST INFECTION OF THE VAGINA: ICD-10-CM

## 2020-03-27 DIAGNOSIS — K21.9 GASTROESOPHAGEAL REFLUX DISEASE WITHOUT ESOPHAGITIS: ICD-10-CM

## 2020-03-27 DIAGNOSIS — N30.00 ACUTE CYSTITIS WITHOUT HEMATURIA: Primary | ICD-10-CM

## 2020-03-27 DIAGNOSIS — N89.8 VAGINAL DISCHARGE: ICD-10-CM

## 2020-03-27 DIAGNOSIS — R10.30 LOWER ABDOMINAL PAIN: ICD-10-CM

## 2020-03-27 DIAGNOSIS — Z00.00 HEALTHCARE MAINTENANCE: ICD-10-CM

## 2020-03-27 LAB
BACTERIA: NORMAL
BACTERIA: NORMAL
BILIRUBIN UR: NEGATIVE MG/DL
BLOOD UR: NEGATIVE MG/DL
CASTS: NORMAL /LPF
CLUE CELLS: NORMAL
CRYSTAL URINE: NORMAL /LPF
EPITHELIAL CELLS UR: NORMAL /LPF (ref 0–2)
GLUCOSE URINE: NEGATIVE
HCG UR QL: NEGATIVE
KETONES UR QL: NEGATIVE MG/DL
LEUKOCYTE ESTERASE UR: NEGATIVE
MOTILE TRICHOMONAS: NEGATIVE
MUCOUS URINE: NORMAL LPF
NITRITE UR QL STRIP: NEGATIVE MG/DL
ODOR: NORMAL
PH UR STRIP: 5.5 [PH] (ref 4.5–8)
PH WET PREP: <4.5 (ref 3.8–4.5)
PROTEIN UR: NEGATIVE MG/DL
RBC URINE: <2 /HPF
SP GR UR STRIP: 1.02 (ref 1–1.03)
UROBILINOGEN UR STRIP-ACNC: NORMAL E.U./DL
WBC URINE: NORMAL /HPF
WBC WET PREP: NORMAL (ref 2–5)
YEAST: NORMAL

## 2020-03-27 RX ORDER — NICOTINE POLACRILEX 4 MG/1
20 GUM, CHEWING ORAL DAILY
Qty: 90 TABLET | Refills: 0 | Status: SHIPPED | OUTPATIENT
Start: 2020-03-27 | End: 2021-09-29

## 2020-03-27 RX ORDER — PRENATAL VIT/IRON FUM/FOLIC AC 27MG-0.8MG
1 TABLET ORAL DAILY
Qty: 60 TABLET | Refills: 11 | Status: SHIPPED | OUTPATIENT
Start: 2020-03-27 | End: 2023-06-15

## 2020-03-27 RX ORDER — LORATADINE 10 MG/1
10 TABLET ORAL DAILY
Qty: 60 TABLET | Refills: 3 | Status: SHIPPED | OUTPATIENT
Start: 2020-03-27 | End: 2021-04-07

## 2020-03-27 RX ORDER — NITROFURANTOIN 25; 75 MG/1; MG/1
100 CAPSULE ORAL 2 TIMES DAILY
Qty: 10 CAPSULE | Refills: 0 | Status: SHIPPED | OUTPATIENT
Start: 2020-03-27 | End: 2020-04-01

## 2020-03-27 RX ORDER — ACETAMINOPHEN 325 MG/1
650 TABLET ORAL EVERY 6 HOURS PRN
Qty: 100 TABLET | Refills: 0 | Status: SHIPPED | OUTPATIENT
Start: 2020-03-27 | End: 2021-04-07

## 2020-03-27 ASSESSMENT — ENCOUNTER SYMPTOMS
FEVER: 0
ROS GI COMMENTS: SEE HPI
CHILLS: 0

## 2020-03-27 NOTE — PROGRESS NOTES
HPI   Sakina Banuelos is a 55 year old  who presents for   Chief Complaint   Patient presents with     Urinary Problem     x 2 days, Pt states a little burning sensation     Abdominal Pain     x3 days, Achy pain, some tightness,      Vaginal Discharge     x 3-4 days, she has more than usual, color is milky and watery     Vaginal Problem     x last night itchiness     Urinary Tract Symptoms  Onset: 2 days     Description:   Painful urination (Dysuria): Yes  Frequent urination:  Yes   Need to urinate urgently: no  Blood in urine (Hematuria):no  Delay in urine (Hesitency): no    Intensity: moderate    Progression of Symptoms:  same    Accompanying Signs & Symptoms:  Fever/chills: no  Flank pain Yes Details: bilat lower lumbar area  Nausea and vomiting: some nausea no vomiting  Any vaginal discharge/symptoms: {Yes Details: see below  Abdominal/Pelvic Pain: Yes     History:   History of frequent UTI's: Yes Details: see below   History of kidney stones: no  Sexually Active: Yes Details:= says that she cannot be pregnant now but is not sure, still gets irregular periods   Possibility of pregnancy: wants test     What makes it worse?: nothing  Positive vaginal itching and white vaginal discharge     Therapies Tried and outcome: tried black seed, honey and garlic    Seen 11/2019 for uti symptoms- found to have enterococcus faecalis ,000 colonies on urine culture, UA looked fine. Started with bactrim at initial visit, once ucx came back switched to macrobid for 5 day duration     Problem, Medication and Allergy Lists were reviewed and updated if needed..    Patient is an established patient of this clinic.  Past Medical History:   Diagnosis Date     Fibroid, uterine      Infertility, female           Review of Systems:   Review of Systems   Constitutional: Negative for chills and fever.   Gastrointestinal:        See hpi   Genitourinary:        See hpi   Musculoskeletal:        See hpi           Physical Exam:  "    Vitals:    03/27/20 0920   BP: 127/82   Pulse: 82   Resp: 18   Temp: 98.8  F (37.1  C)   TempSrc: Oral   SpO2: 98%   Height: 1.664 m (5' 5.5\")     Body mass index is 37.86 kg/m .  Vitals were reviewed and were normal     Physical Exam  Vitals signs reviewed. Exam conducted with a chaperone present.   Constitutional:       General: She is not in acute distress.     Appearance: Normal appearance. She is not ill-appearing or diaphoretic.   Eyes:      General: No scleral icterus.     Conjunctiva/sclera: Conjunctivae normal.   Pulmonary:      Effort: Pulmonary effort is normal. No respiratory distress.   Abdominal:      General: There is no distension.      Tenderness: There is abdominal tenderness (mild suprapubic ttp). There is no right CVA tenderness, left CVA tenderness, guarding or rebound.   Genitourinary:     Labia:         Right: No tenderness.         Left: No tenderness.       Vagina: No vaginal discharge.   Skin:     General: Skin is warm and dry.      Capillary Refill: Capillary refill takes less than 2 seconds.   Neurological:      Mental Status: She is alert.   Psychiatric:         Mood and Affect: Mood normal.         Behavior: Behavior normal.         Thought Content: Thought content normal.         Judgment: Judgment normal.         Results:      Results from this visit  Results for orders placed or performed in visit on 03/27/20   Wet Prep ([facility])     Status: None   Result Value Ref Range    Yeast Wet Prep None none    Motile Trichomonas Wet Prep Negative Negative    Clue Cells Wet Prep None NONE    WBC WET PREP None 2 - 5    Bacteria Wet Prep Few None    pH Wet Prep <4.5 3.8 - 4.5    Odor Wet Prep None NONE     Assessment and Plan    Sakina was seen today for urinary problem, abdominal pain, vaginal discharge and vaginal problem.    Diagnoses and all orders for this visit:    Acute cystitis without hematuria  No flank ttp. No fever. HDS here with mild suprapubic ttp. With hx of enterococcus " infection 11/2019, will treat with macrobid again. Follow up urine culture to see if need for change in antibiotic.   -     nitroFURantoin macrocrystal-monohydrate (MACROBID) 100 MG capsule; Take 1 capsule (100 mg) by mouth 2 times daily for 5 days  Lower abdominal pain  -     Urinalysis (UA) ([facility])  -     Urine Microscopic (UA) ([facility])  -     Urine Culture Aerobic Bacterial  -     Wet Prep ([facility])  -     HCG Qualitative Urine (UPT) ([facility])    Yeast infection of the vagina  Vaginal discharge  High pretest probability- wet prep showed no yeast but will treat with vaginal itching and change in vaginal discharge.   -     miconazole 200 & 2 MG-% (9GM) KIT; Place 1 each vaginally At Bedtime for 3 days    Gastroesophageal reflux disease without esophagitis  -     omeprazole 20 MG tablet; Take 1 tablet (20 mg) by mouth daily Take 30-60 minutes before a meal.    Healthcare maintenance  -     Prenatal Vit-Fe Fumarate-FA (PRENATAL MULTIVITAMIN W/IRON) 27-0.8 MG tablet; Take 1 tablet by mouth daily  -     acetaminophen (TYLENOL) 325 MG tablet; Take 2 tablets (650 mg) by mouth every 6 hours as needed for mild pain  -     loratadine (CLARITIN) 10 MG tablet; Take 1 tablet (10 mg) by mouth daily     Medications Discontinued During This Encounter   Medication Reason     Prenatal Vit-Fe Fumarate-FA (PRENATAL MULTIVITAMIN W/IRON) 27-0.8 MG tablet Reorder     omeprazole 20 MG tablet Reorder     acetaminophen (TYLENOL) 325 MG tablet Reorder     loratadine (CLARITIN) 10 MG tablet Reorder     Options for treatment and follow-up care were reviewed with the patient. Sakina Banuelos  engaged in the decision making process and verbalized understanding of the options discussed and agreed with the final plan.    Vikram Israel MD

## 2020-03-27 NOTE — PROGRESS NOTES
Preceptor Attestation:   Patient seen, evaluated and discussed with the resident. I have verified the content of the note, which accurately reflects my assessment of the patient and the plan of care.   Supervising Physician:  Darek Crowe MD

## 2020-03-27 NOTE — PATIENT INSTRUCTIONS
Use monistat for 3 nights to treat yeast infection. This should help with vaginal itching and white,thick discharge.    Take macrobid once in AM once in PM for 5 days total for urinary tract infection.   I will call you if the urine culture grows something that needs a different antibiotic.     Call clinic if you have fever or chills, worsening abdominal pain or any other concerns.

## 2020-03-28 LAB
BACTERIA SPEC CULT: NORMAL
Lab: NORMAL
SPECIMEN SOURCE: NORMAL

## 2020-05-13 DIAGNOSIS — Z00.00 HEALTH CARE MAINTENANCE: ICD-10-CM

## 2020-05-13 NOTE — TELEPHONE ENCOUNTER
Vit D3 2,000 unit caps    LAST FILLED DATE: 03/24/2020  LAST FILLED STRENGTH: 50 MCG  LAST FILLED QTY: 30  LAST OFFICE VISIT: 03/27/2020

## 2020-05-14 RX ORDER — ACETAMINOPHEN 160 MG
1 TABLET,DISINTEGRATING ORAL DAILY
Qty: 30 CAPSULE | Refills: 11 | Status: SHIPPED | OUTPATIENT
Start: 2020-05-14 | End: 2021-04-07

## 2020-09-17 ENCOUNTER — VIRTUAL VISIT (OUTPATIENT)
Dept: FAMILY MEDICINE | Facility: CLINIC | Age: 55
End: 2020-09-17
Payer: COMMERCIAL

## 2020-09-17 DIAGNOSIS — Z13.9 SCREENING FOR CONDITION: ICD-10-CM

## 2020-09-17 DIAGNOSIS — R30.0 DYSURIA: Primary | ICD-10-CM

## 2020-09-17 LAB
BILIRUBIN UR: NEGATIVE MG/DL
BLOOD UR: NEGATIVE MG/DL
GLUCOSE URINE: NEGATIVE
HCG UR QL: NEGATIVE
KETONES UR QL: NEGATIVE MG/DL
LEUKOCYTE ESTERASE UR: NEGATIVE
NITRITE UR QL STRIP: NEGATIVE MG/DL
PH UR STRIP: 5 [PH] (ref 4.5–8)
PROTEIN UR: NEGATIVE MG/DL
SP GR UR STRIP: 1.02 (ref 1–1.03)
UROBILINOGEN UR STRIP-ACNC: 0.2 E.U./DL

## 2020-09-17 RX ORDER — NITROFURANTOIN 25; 75 MG/1; MG/1
100 CAPSULE ORAL 2 TIMES DAILY
Qty: 10 CAPSULE | Refills: 0 | Status: SHIPPED | OUTPATIENT
Start: 2020-09-17 | End: 2020-09-19

## 2020-09-17 NOTE — PROGRESS NOTES
Preceptor Attestation:   I talked to the patient on the phone. I discussed the patient with the resident. I have verified the content of the note, which accurately reflects my assessment of the patient and the plan of care.   Supervising Physician:  Padmini Lee MD.

## 2020-09-17 NOTE — PROGRESS NOTES
"Family Medicine Telephone Visit Note           Telephone Visit Consent   Patient was verbally read the following and verbal consent was obtained.    \"Telephone visits are billed at different rates depending on your insurance coverage. During this emergency period, for some insurers they may be billed the same as an in-person visit.  Please reach out to your insurance provider with any questions.  If during the course of the call the physician/provider feels a telephone visit is not appropriate, you will not be charged for this service.\"    Name person giving consent:  Patient   Date verbal consent given:  9/17/2020  Time verbal consent given:  9:47 AM     Chief Complaint   Patient presents with     Frequency     abdominal pain & pain with urination - requesting UPT               HPI   Patients name: Sakina  Appointment start time:  10:30 AM    1. Abdominal pain  Feeling abd pain, lots of pain in her urine  Lower, more on the R side, constant  It was really bad last night, better this AM but not gone  Some occasional kidney area pain below the bra strap on her back since the abdominal pain started  Doesn't drink a lot of water, but urine looked more darker and concentrated than usual  No fever, feels myalgias, little nausea  Still gets periods- very irregular her whole life for PCOS  No vaginal discharge  No diarrhea or constipation  Last time: bactrim -> macrobid in June 2019, didn't take macrobid with her   Had a brief episode earlier this spring with Dr Israel - workup negative, never had antibiotics, got better on its own      Current Outpatient Medications   Medication Sig Dispense Refill     acetaminophen (TYLENOL) 325 MG tablet Take 2 tablets (650 mg) by mouth every 6 hours as needed for mild pain 100 tablet 0     Cholecalciferol (VITAMIN D3) 50 MCG (2000 UT) CAPS Take 1 tablet by mouth daily 30 capsule 11     loratadine (CLARITIN) 10 MG tablet Take 1 tablet (10 mg) by mouth daily 60 tablet 3     omeprazole 20 " "MG tablet Take 1 tablet (20 mg) by mouth daily Take 30-60 minutes before a meal. 90 tablet 0     Prenatal Vit-Fe Fumarate-FA (PRENATAL MULTIVITAMIN W/IRON) 27-0.8 MG tablet Take 1 tablet by mouth daily 60 tablet 11     mupirocin (BACTROBAN) 2 % external ointment Apply topically 3 times daily (Patient not taking: Reported on 3/27/2020) 1 g 0     valACYclovir (VALTREX) 500 MG tablet Take 1 tablet (500 mg) by mouth 2 times daily for 3 days 6 tablet 0     No Known Allergies           Review of Systems:              Physical Exam:     Breastfeeding No   Estimated body mass index is 37.86 kg/m  as calculated from the following:    Height as of 3/27/20: 1.664 m (5' 5.5\").    Weight as of 1/25/13: 104.8 kg (231 lb).    Exam:  Constitutional: healthy, alert and no distress  Psychiatric: mentation appears normal and affect normal/bright    Results from last visit:  Orders Only on 09/17/2020   Component Date Value Ref Range Status     HCG Qual Urine 09/17/2020 Negative  Negative Final     Specific Gravity Urine 09/17/2020 1.020  1.005 - 1.030 Final     pH Urine 09/17/2020 5.0  4.5 - 8.0 Final     Leukocyte Esterase UR 09/17/2020 NEGATIVE  NEGATIVE Final     Nitrite Urine 09/17/2020 NEGATIVE  NEGATIVE mg/dL Final     Protein UR 09/17/2020 NEGATIVE  NEGATIVE mg/dL Final     Glucose Urine 09/17/2020 NEGATIVE  NEGATIVE Final     Ketones Urine 09/17/2020 NEGATIVE  NEGATIVE mg/dL Final     Urobilinogen mg/dL 09/17/2020 0.2  E.U./dL Final     Bilirubin UR 09/17/2020 NEGATIVE  NEGATIVE mg/dL Final     Blood UR 09/17/2020 NEGATIVE  NEGATIVE mg/dL Final           Assessment and Plan   Sakina was seen today for concerns.    Diagnoses and all orders for this visit:    Dysuria  -     Urine Culture Aerobic Bacterial  -     nitroFURantoin macrocrystal-monohydrate (MACROBID) 100 MG capsule; Take 1 capsule (100 mg) by mouth 2 times daily for 5 days  -     Urinalysis, Micro If (UA) ([facility]); Future  -     HCG Qualitative Urine (UPT) " ([facility]); Future  Patient dropped of urine sample earlier today- no results at the time of the call. Discussed I'll send uncomplicated UTI treatment to pharmacy for if her symptoms get too bad, but in the meantime we'll wait and see what her urine studies show. Patient agreeable to this. Will call with results.  Discussed appendicits precautions given that her pelvic pain is more R-sided - come to clinic or return to ER.    Refilled medications that would be required in the next 3 months.     After Visit Information:  Patient chose to view AVS via [employer]    No follow-ups on file.    Appointment end time: 10:42 AM  This is a telephone visit that took 12 minutes.      Clinician location:  [facility]     DO [facility] Resident PGY-3  [phone]    I precepted today with Dr Lee.

## 2020-09-18 LAB
BACTERIA SPEC CULT: NORMAL
Lab: NORMAL
SPECIMEN SOURCE: NORMAL

## 2020-09-18 NOTE — PATIENT INSTRUCTIONS
Here is the plan from today's visit    1.  Dysuria  Urine initial tests don't look like infection. We'll send the culture out and see if bacteria grow. If your symptoms get very bad, you can just start the antibiotic I sent to the pharmacy, but if the urine shows something else we can change course later.  If your R lower abdominal pain gets worse, you get fever, nausea/vomiting, come in or go to the ER because I'd hate to miss an appendicitis.  - Urine Culture Aerobic Bacterial  - nitroFURantoin macrocrystal-monohydrate (MACROBID) 100 MG capsule; Take 1 capsule (100 mg) by mouth 2 times daily for 5 days  Dispense: 10 capsule; Refill: 0      Please call or return to clinic if your symptoms don't go away.    Follow up plan  Phone with results    Thank you for coming to [facility] today.  Lab Testing:  **If you had lab testing today and your results are reassuring or normal they will be mailed to you or sent through [email or web address] within 7 days.   **If the lab tests need quick action we will call you with the results.  The phone number we will call with results is # [phone] (home) . If this is not the best number please call our clinic and change the number.  Medication Refills:  If you need any refills please call your pharmacy and they will contact us.   If you need to  your refill at a new pharmacy, please contact the new pharmacy directly. The new pharmacy will help you get your medications transferred faster.   Scheduling:  If you have any concerns about today's visit or wish to schedule another appointment please call our office during normal business hours [phone] (8-5:00 M-F)  If a referral was made to a [facility] and you don't get a call from central scheduling please call [phone].  If a Mammogram was ordered for you at [facility] call [phone] to schedule or change your appointment.  If you had an XRay/CT/Ultrasound/MRI ordered the number is  [phone] to schedule or change your radiology appointment.   Medical Concerns:  If you have urgent medical concerns please call [phone] at any time of the day.    Tacho Villanueva, DO

## 2020-09-19 DIAGNOSIS — R10.31 RLQ ABDOMINAL PAIN: Primary | ICD-10-CM

## 2020-09-23 ENCOUNTER — ANCILLARY PROCEDURE (OUTPATIENT)
Dept: ULTRASOUND IMAGING | Facility: CLINIC | Age: 55
End: 2020-09-23
Attending: FAMILY MEDICINE
Payer: COMMERCIAL

## 2020-09-23 DIAGNOSIS — R10.31 RLQ ABDOMINAL PAIN: ICD-10-CM

## 2021-03-12 ENCOUNTER — OFFICE VISIT (OUTPATIENT)
Dept: FAMILY MEDICINE | Facility: CLINIC | Age: 56
End: 2021-03-12
Payer: COMMERCIAL

## 2021-03-12 VITALS
SYSTOLIC BLOOD PRESSURE: 127 MMHG | OXYGEN SATURATION: 100 % | TEMPERATURE: 98.1 F | HEART RATE: 74 BPM | DIASTOLIC BLOOD PRESSURE: 84 MMHG

## 2021-03-12 DIAGNOSIS — N97.9 INFERTILITY, FEMALE: Chronic | ICD-10-CM

## 2021-03-12 DIAGNOSIS — R10.2 SUPRAPUBIC DISCOMFORT: Primary | ICD-10-CM

## 2021-03-12 DIAGNOSIS — D50.0 IRON DEFICIENCY ANEMIA DUE TO CHRONIC BLOOD LOSS: ICD-10-CM

## 2021-03-12 LAB
BILIRUBIN UR: NEGATIVE MG/DL
BLOOD UR: NEGATIVE MG/DL
ERYTHROCYTE [DISTWIDTH] IN BLOOD BY AUTOMATED COUNT: 14.2 % (ref 10–15)
FERRITIN SERPL-MCNC: 26 NG/ML (ref 8–252)
GLUCOSE URINE: NEGATIVE
HBA1C MFR BLD: 5.6 % (ref 4.1–5.7)
HCT VFR BLD AUTO: 41.5 % (ref 35–47)
HGB BLD-MCNC: 13.3 G/DL (ref 11.7–15.7)
KETONES UR QL: NEGATIVE MG/DL
LEUKOCYTE ESTERASE UR: NEGATIVE
MCH RBC QN AUTO: 28.7 PG (ref 26.5–33)
MCHC RBC AUTO-ENTMCNC: 32 G/DL (ref 31.5–36.5)
MCV RBC AUTO: 89 FL (ref 78–100)
NITRITE UR QL STRIP: NEGATIVE MG/DL
PH UR STRIP: 5.5 [PH] (ref 4.5–8)
PLATELET # BLD AUTO: 195 10E9/L (ref 150–450)
PROTEIN UR: NEGATIVE MG/DL
RBC # BLD AUTO: 4.64 10E12/L (ref 3.8–5.2)
SP GR UR STRIP: >=1.03 (ref 1–1.03)
UROBILINOGEN UR STRIP-ACNC: NORMAL E.U./DL
WBC # BLD AUTO: 6 10E9/L (ref 4–11)

## 2021-03-12 PROCEDURE — 99000 SPECIMEN HANDLING OFFICE-LAB: CPT | Performed by: FAMILY MEDICINE

## 2021-03-12 PROCEDURE — 81003 URINALYSIS AUTO W/O SCOPE: CPT | Performed by: FAMILY MEDICINE

## 2021-03-12 PROCEDURE — 83520 IMMUNOASSAY QUANT NOS NONAB: CPT | Mod: 90 | Performed by: FAMILY MEDICINE

## 2021-03-12 PROCEDURE — 85027 COMPLETE CBC AUTOMATED: CPT | Performed by: FAMILY MEDICINE

## 2021-03-12 PROCEDURE — 36415 COLL VENOUS BLD VENIPUNCTURE: CPT | Performed by: FAMILY MEDICINE

## 2021-03-12 PROCEDURE — 99214 OFFICE O/P EST MOD 30 MIN: CPT | Mod: GC | Performed by: FAMILY MEDICINE

## 2021-03-12 PROCEDURE — 83036 HEMOGLOBIN GLYCOSYLATED A1C: CPT | Performed by: FAMILY MEDICINE

## 2021-03-12 PROCEDURE — 82728 ASSAY OF FERRITIN: CPT | Performed by: FAMILY MEDICINE

## 2021-03-12 PROCEDURE — 83001 ASSAY OF GONADOTROPIN (FSH): CPT | Performed by: FAMILY MEDICINE

## 2021-03-12 NOTE — PROGRESS NOTES
[facility] visit      Assessment & Plan     Sakina is a 56 year old female, who was assessed for the following problems:    Problem List Items Addressed This Visit        Medium    Iron deficiency anemia due to chronic blood loss (Chronic)     History of anemia with current generalized weakness/fatigue. Will re-check HGB, further workup and treatment per results.           Relevant Orders    CBC with platelets (Completed)    Ferritin (Completed)    Infertility, female (Chronic)     Previous diagnosis of PCOS, with regular menses, and frequent unprotected intercourse. Patient notes that age on file is incorrect and that she is really in her mid 40's.   Will check AMH and FSH today, as well as A1c given concomitant obesity.          Relevant Orders    Anti-Mullerian hormone (Completed)    Hemoglobin A1c ([facility]) (Completed)    Follicle stimulating hormone (Completed)    Suprapubic discomfort - Primary     Low concern for UTI clinically, UA not consistent with infection. Patient declines pelvic examination.         Relevant Orders    Urinalysis, Micro If (UA) ([facility]) (Completed)         Review of the result(s) of each unique test - hgb    F/u with PCP for check-up     Options for treatment and follow-up care were reviewed with the patient/caregivers. They engaged in the decision making process and verbalized understanding of the options discussed and agreed with the final plan.    Taylor Blevins MD  Family Medicine Resident [facility], PGY-3  Phone: [phone]    [facility]    Subjective   History obtained from patient and chart review.  Patient speaks English,  was not present for this visit.      Sakina Banuelos is a 56 (44) year old female, who presents with:  Chief Complaint   Patient presents with     UTI     Feeling pain lower abdominal pain and back, dark urine, having headache, feeling weak, wants a uti check, feeling nausea     Having generalized weakness, wants anemia  "checked  Food makes her feel better  Low back and suprapubic discomfort x 3 days  LMP 2.5 weeks ago  Monthly periods, not heavy, 6-7 days  No dysuria, frequency, hematuria  Also concerned about not becoming pregnant despite trying for years. States that she's had multiple workups in the past that \"showed nothing was wrong\" with her.     ROS  7-point ROS negative except as described above.    Objective     Physical Exam  /84   Pulse 74   Temp 98.1  F (36.7  C) (Oral)   SpO2 100%     Vitals were reviewed and were normal     General: pleasant middle-age woman in no acute distress.    HEENT: no signs of trauma. No icterus or conjunctival injection. Pupils are equal.   Resp: normal work of breathing.    Neuro: alert and interactive. No gross focal findings.   Skin: no lesions noted on exposed skin.  Psych: affect is concordant with mood, behavior is normal.     Results  pending    "

## 2021-03-12 NOTE — LETTER
3/12/2021         RE: Sakina Banuelos  [address]  [facility] [address]      \[facility] [place] [facility]\"" Clinic visit      Assessment & Plan     Sakina is a 56 year old female, who was assessed for the following problems:    Problem List Items Addressed This Visit        Medium    Iron deficiency anemia due to chronic blood loss (Chronic)     History of anemia with current generalized weakness/fatigue. Will re-check HGB, further workup and treatment per results.           Relevant Orders    CBC with platelets (Completed)    Ferritin (Completed)    Infertility, female (Chronic)     Previous diagnosis of PCOS, with regular menses, and frequent unprotected intercourse. Patient notes that age on file is incorrect and that she is really in her mid 40's.   Will check AMH and FSH today, as well as A1c given concomitant obesity.          Relevant Orders    Anti-Mullerian hormone (Completed)    Hemoglobin A1c ([facility]) (Completed)    Follicle stimulating hormone (Completed)    Suprapubic discomfort - Primary     Low concern for UTI clinically, UA not consistent with infection. Patient declines pelvic examination.         Relevant Orders    Urinalysis, Micro If (UA) ([facility]) (Completed)         Review of the result(s) of each unique test - hgb    F/u with PCP for check-up     Options for treatment and follow-up care were reviewed with the patient/caregivers. They engaged in the decision making process and verbalized understanding of the options discussed and agreed with the final plan.    Taylor Blevins MD  Family Medicine Resident [facility], PGY-3  Phone: [phone]    [facility]    Subjective   History obtained from patient and chart review.  Patient speaks English,  was not present for this visit.      Sakina Banuelos is a 56 (44) year old female, who presents with:  Chief Complaint   Patient presents with     UTI     Feeling pain lower abdominal pain and back, dark urine, having  "headache, feeling weak, wants a uti check, feeling nausea     Having generalized weakness, wants anemia checked  Food makes her feel better  Low back and suprapubic discomfort x 3 days  LMP 2.5 weeks ago  Monthly periods, not heavy, 6-7 days  No dysuria, frequency, hematuria  Also concerned about not becoming pregnant despite trying for years. States that she's had multiple workups in the past that \"showed nothing was wrong\" with her.     ROS  7-point ROS negative except as described above.    Objective     Physical Exam  /84   Pulse 74   Temp 98.1  F (36.7  C) (Oral)   SpO2 100%     Vitals were reviewed and were normal     General: pleasant middle-age woman in no acute distress.    HEENT: no signs of trauma. No icterus or conjunctival injection. Pupils are equal.   Resp: normal work of breathing.    Neuro: alert and interactive. No gross focal findings.   Skin: no lesions noted on exposed skin.  Psych: affect is concordant with mood, behavior is normal.     Results  pending      Preceptor Attestation:   Patient seen, evaluated and discussed with the resident. I have verified the content of the note, which accurately reflects my assessment of the patient and the plan of care.   Supervising Physician:  DO Taylor Rivera MD  "

## 2021-03-12 NOTE — LETTER
March 17, 2021      Sakina Banuelos  [address]  [place] [address]        Dear Sakina,    Thank you for getting your care at [facility]. Please see below for your test results.    Resulted Orders   Urinalysis, Micro If (UA) ([facility])   Result Value Ref Range    Specific Gravity Urine >=1.030 1.005 - 1.030    pH Urine 5.5 4.5 - 8.0    Leukocyte Esterase UR Negative NEGATIVE    Nitrite Urine Negative NEGATIVE mg/dL    Protein UR Negative NEGATIVE mg/dL    Glucose Urine Negative NEGATIVE    Ketones Urine Negative NEGATIVE mg/dL    Urobilinogen mg/dL 0.2 E.U./dL 0.2 E.U./dL E.U./dL    Bilirubin UR Negative NEGATIVE mg/dL    Blood UR Negative NEGATIVE mg/dL   CBC with platelets   Result Value Ref Range    WBC 6.0 4.0 - 11.0 10e9/L    RBC Count 4.64 3.8 - 5.2 10e12/L    Hemoglobin 13.3 11.7 - 15.7 g/dL    Hematocrit 41.5 35.0 - 47.0 %    MCV 89 78 - 100 fl    MCH 28.7 26.5 - 33.0 pg    MCHC 32.0 31.5 - 36.5 g/dL    RDW 14.2 10.0 - 15.0 %    Platelet Count 195 150 - 450 10e9/L   Anti-Mullerian hormone   Result Value Ref Range    Anti-Mullerian Hormone <0.003 ng/mL      Comment:      (Note)  INTERPRETIVE INFORMATION: Anti-Mullerian Hormone  FEMALE:  6 months - 14 years: 0.256 - 6.345 ng/mL  15-17 years:         0.861 - 10.451 ng/mL  18-29 years:         0.401 - 16.015 ng/mL  30-39 years:         0.176 - 11.705 ng/mL  40-45 years:         6.282 ng/mL or less  46-50 years:         0.064 ng/mL or less  Post-menopausal:     0.003 ng/mL or less  MALE:  6-11 months:         56.677 - 495.299 ng/mL  1-6 years:           33.442 - 342.450 ng/mL  7-9 years:           20.245 - 189.781 ng/mL  10-12 years:         2.903 - 178.243 ng/mL  13 years and older:  2.079 - 30.656 ng/mL  This test was developed and its performance characteristics   determined by [facility]. It has not been cleared or   approved by the [place] Food and Drug Administration. This test   was performed in a CLIA certified laboratory and  is   intended for clinical purposes.  Performed By: [facility]  [address] [place] [address]  : Dee Bennett MD     Hemoglobin A1c ([facility])   Result Value Ref Range    Hemoglobin A1C 5.6 4.1 - 5.7 %   Ferritin   Result Value Ref Range    Ferritin 26 8 - 252 ng/mL   Follicle stimulating hormone   Result Value Ref Range    FSH 18.6 IU/L      Comment:      FSH Reference Range  Female: Follicular      2.5-10.2          Mid-cycle       3.4-33.4          Luteal          1.5-9.1          Postmenopausal  23.0-116.3         As we discussed at our last visit, please follow up with a clinic appointment with Dr. Lee for your physical and to review these results further.    Sincerely,    Taylor Blevins MD

## 2021-03-13 LAB — FSH SERPL-ACNC: 18.6 IU/L

## 2021-03-16 LAB — MIS SERPL-MCNC: <0.003 NG/ML

## 2021-03-20 NOTE — RESULT ENCOUNTER NOTE
Please call patient for an appointment with Dr. Lee as soon as convenient. Iron, anemia, diabetes were all good, but one of the hormones that we checked to see why she was not getting pregnant is low, and she should follow-up with Dr. Lee on this.   Thanks,   Taylor Blevins

## 2021-03-21 PROBLEM — D50.0 IRON DEFICIENCY ANEMIA DUE TO CHRONIC BLOOD LOSS: Chronic | Status: ACTIVE | Noted: 2021-03-21

## 2021-03-21 PROBLEM — R10.2 SUPRAPUBIC DISCOMFORT: Status: ACTIVE | Noted: 2021-03-21

## 2021-03-21 PROBLEM — D50.0 IRON DEFICIENCY ANEMIA DUE TO CHRONIC BLOOD LOSS: Status: ACTIVE | Noted: 2021-03-21

## 2021-03-21 NOTE — ASSESSMENT & PLAN NOTE
Previous diagnosis of PCOS, with regular menses, and frequent unprotected intercourse. Patient notes that age on file is incorrect and that she is really in her mid 40's.   Will check AMH and FSH today, as well as A1c given concomitant obesity.

## 2021-03-21 NOTE — ASSESSMENT & PLAN NOTE
History of anemia with current generalized weakness/fatigue. Will re-check HGB, further workup and treatment per results.

## 2021-03-21 NOTE — ASSESSMENT & PLAN NOTE
Low concern for UTI clinically, UA not consistent with infection. Patient declines pelvic examination.

## 2021-03-24 NOTE — PROGRESS NOTES
Preceptor Attestation:   Patient seen, evaluated and discussed with the resident. I have verified the content of the note, which accurately reflects my assessment of the patient and the plan of care.   Supervising Physician:  Miley Flores, DO

## 2021-04-06 NOTE — PROGRESS NOTES
Assessment & Plan     Female infertility  - does not plan to pursue any interventions. Would advise no further lab testing on this.    Medication refills  - loratadine (CLARITIN) 10 MG tablet; Take 1 tablet (10 mg) by mouth daily  - acetaminophen (TYLENOL) 325 MG tablet; Take 2 tablets (650 mg) by mouth every 6 hours as needed for mild pain    Preventative care  - scheduled for COVID vaccine  - invited to schedule for CPE. Not due for pap until 2024.  - would benefit from weight management  - offered and declined mammogram   [id number]}    Padmini Lee MD  [facility] QAMAR Presley  presents for the following health issues:  Results and refills    HPI   Younger than stated age - 44  Seen by Dr. Blevins on 3/12/21 and requested repeat lab testing to eval her chronic infertility. Advised to return to discuss results.     Sakina has long standing primary infertility. She is  and has intercourse regularly but has never become pregnant. She typically has periods monthly, a little more irregular in the last few months. Used to be very heavy but this resolved with a myomectomy some years ago. She had infertility evaluations with Gynecology and Endocrinology in our system dating back to 2008 and did not opt to pursue any fertility treatments. Occasionally she asks for labs to see if anything has changed.     We reviewed her most recent results together  Her FSH is in the midcycle range  Her anti mullerian hormone level is very low. Explained that this result is predictive of low ovarian reserve and that this has not changed over time.  Remainder of results reviewed together as well.     Review of Systems   Reported as neg x 10      Objective    /84   Pulse 69   Temp 98.1  F (36.7  C) (Oral)   Resp 16   SpO2 97%      Physical Exam  Constitutional:       General: She is not in acute distress.     Appearance: She is obese.   Neurological:      Mental Status: She is alert.    Psychiatric:         Mood and Affect: Mood normal.            Office Visit on 03/12/2021   Component Date Value Ref Range Status     Specific Gravity Urine 03/12/2021 >=1.030  1.005 - 1.030 Final     pH Urine 03/12/2021 5.5  4.5 - 8.0 Final     Leukocyte Esterase UR 03/12/2021 Negative  NEGATIVE Final     Nitrite Urine 03/12/2021 Negative  NEGATIVE mg/dL Final     Protein UR 03/12/2021 Negative  NEGATIVE mg/dL Final     Glucose Urine 03/12/2021 Negative  NEGATIVE Final     Ketones Urine 03/12/2021 Negative  NEGATIVE mg/dL Final     Urobilinogen mg/dL 03/12/2021 0.2 E.U./dL  0.2 E.U./dL E.U./dL Final     Bilirubin UR 03/12/2021 Negative  NEGATIVE mg/dL Final     Blood UR 03/12/2021 Negative  NEGATIVE mg/dL Final     WBC 03/12/2021 6.0  4.0 - 11.0 10e9/L Final     RBC Count 03/12/2021 4.64  3.8 - 5.2 10e12/L Final     Hemoglobin 03/12/2021 13.3  11.7 - 15.7 g/dL Final     Hematocrit 03/12/2021 41.5  35.0 - 47.0 % Final     MCV 03/12/2021 89  78 - 100 fl Final     MCH 03/12/2021 28.7  26.5 - 33.0 pg Final     MCHC 03/12/2021 32.0  31.5 - 36.5 g/dL Final     RDW 03/12/2021 14.2  10.0 - 15.0 % Final     Platelet Count 03/12/2021 195  150 - 450 10e9/L Final     Anti-Mullerian Hormone 03/12/2021 <0.003  ng/mL Final    Comment: (Note)  INTERPRETIVE INFORMATION: Anti-Mullerian Hormone  FEMALE:  6 months - 14 years: 0.256 - 6.345 ng/mL  15-17 years:         0.861 - 10.451 ng/mL  18-29 years:         0.401 - 16.015 ng/mL  30-39 years:         0.176 - 11.705 ng/mL  40-45 years:         6.282 ng/mL or less  46-50 years:         0.064 ng/mL or less  Post-menopausal:     0.003 ng/mL or less  MALE:  6-11 months:         56.677 - 495.299 ng/mL  1-6 years:           33.442 - 342.450 ng/mL  7-9 years:           20.245 - 189.781 ng/mL  10-12 years:         2.903 - 178.243 ng/mL  13 years and older:  2.079 - 30.656 ng/mL  This test was developed and its performance characteristics   determined by [employer]. It has not been  cleared or   approved by the US Food and Drug Administration. This test   was performed in a CLIA certified laboratory and is   intended for clinical purposes.  Performed By: [facility]  [address] [place] [id number]  : Dee Bennett MD       Hemoglobin A1C 03/12/2021 5.6  4.1 - 5.7 % Final     Ferritin 03/12/2021 26  8 - 252 ng/mL Final     FSH 03/12/2021 18.6  IU/L Final    Comment: FSH Reference Range  Female: Follicular      2.5-10.2          Mid-cycle       3.4-33.4          Luteal          1.5-9.1          Postmenopausal  23.0-116.3

## 2021-04-07 ENCOUNTER — OFFICE VISIT (OUTPATIENT)
Dept: FAMILY MEDICINE | Facility: CLINIC | Age: 56
End: 2021-04-07
Payer: COMMERCIAL

## 2021-04-07 VITALS
SYSTOLIC BLOOD PRESSURE: 129 MMHG | RESPIRATION RATE: 16 BRPM | OXYGEN SATURATION: 97 % | HEART RATE: 69 BPM | TEMPERATURE: 98.1 F | DIASTOLIC BLOOD PRESSURE: 84 MMHG

## 2021-04-07 DIAGNOSIS — N97.9 FEMALE INFERTILITY: Primary | ICD-10-CM

## 2021-04-07 DIAGNOSIS — Z00.00 HEALTHCARE MAINTENANCE: ICD-10-CM

## 2021-04-07 DIAGNOSIS — Z76.0 ENCOUNTER FOR MEDICATION REFILL: ICD-10-CM

## 2021-04-07 PROCEDURE — 99213 OFFICE O/P EST LOW 20 MIN: CPT | Performed by: FAMILY MEDICINE

## 2021-04-07 RX ORDER — LORATADINE 10 MG/1
10 TABLET ORAL DAILY
Qty: 60 TABLET | Refills: 3 | Status: SHIPPED | OUTPATIENT
Start: 2021-04-07 | End: 2021-09-29

## 2021-04-07 RX ORDER — ACETAMINOPHEN 325 MG/1
650 TABLET ORAL EVERY 6 HOURS PRN
Qty: 100 TABLET | Refills: 0 | Status: SHIPPED | OUTPATIENT
Start: 2021-04-07 | End: 2021-09-29

## 2021-04-07 RX ORDER — ACETAMINOPHEN 160 MG
1 TABLET,DISINTEGRATING ORAL DAILY
Qty: 30 CAPSULE | Refills: 11 | Status: SHIPPED | OUTPATIENT
Start: 2021-04-07 | End: 2021-09-29

## 2021-09-29 ENCOUNTER — OFFICE VISIT (OUTPATIENT)
Dept: FAMILY MEDICINE | Facility: CLINIC | Age: 56
End: 2021-09-29
Payer: COMMERCIAL

## 2021-09-29 VITALS
DIASTOLIC BLOOD PRESSURE: 84 MMHG | HEART RATE: 81 BPM | SYSTOLIC BLOOD PRESSURE: 126 MMHG | OXYGEN SATURATION: 98 % | RESPIRATION RATE: 16 BRPM | TEMPERATURE: 98.5 F

## 2021-09-29 DIAGNOSIS — M25.511 CHRONIC PAIN OF BOTH SHOULDERS: ICD-10-CM

## 2021-09-29 DIAGNOSIS — E55.9 VITAMIN D DEFICIENCY: ICD-10-CM

## 2021-09-29 DIAGNOSIS — R53.83 FATIGUE, UNSPECIFIED TYPE: ICD-10-CM

## 2021-09-29 DIAGNOSIS — Z23 NEED FOR PROPHYLACTIC VACCINATION AND INOCULATION AGAINST INFLUENZA: ICD-10-CM

## 2021-09-29 DIAGNOSIS — K21.9 GASTROESOPHAGEAL REFLUX DISEASE WITHOUT ESOPHAGITIS: ICD-10-CM

## 2021-09-29 DIAGNOSIS — M25.512 CHRONIC PAIN OF BOTH SHOULDERS: ICD-10-CM

## 2021-09-29 DIAGNOSIS — G89.29 CHRONIC PAIN OF BOTH SHOULDERS: ICD-10-CM

## 2021-09-29 DIAGNOSIS — Z00.00 HEALTHCARE MAINTENANCE: ICD-10-CM

## 2021-09-29 DIAGNOSIS — Z11.1 SCREENING EXAMINATION FOR PULMONARY TUBERCULOSIS: Primary | ICD-10-CM

## 2021-09-29 LAB
FASTING STATUS PATIENT QL REPORTED: ABNORMAL
GLUCOSE BLD-MCNC: 112 MG/DL (ref 70–99)
HGB BLD-MCNC: 12.7 G/DL (ref 11.7–15.7)

## 2021-09-29 PROCEDURE — 82947 ASSAY GLUCOSE BLOOD QUANT: CPT | Performed by: STUDENT IN AN ORGANIZED HEALTH CARE EDUCATION/TRAINING PROGRAM

## 2021-09-29 PROCEDURE — 90682 RIV4 VACC RECOMBINANT DNA IM: CPT | Performed by: STUDENT IN AN ORGANIZED HEALTH CARE EDUCATION/TRAINING PROGRAM

## 2021-09-29 PROCEDURE — 99214 OFFICE O/P EST MOD 30 MIN: CPT | Mod: 25 | Performed by: STUDENT IN AN ORGANIZED HEALTH CARE EDUCATION/TRAINING PROGRAM

## 2021-09-29 PROCEDURE — 36415 COLL VENOUS BLD VENIPUNCTURE: CPT | Performed by: STUDENT IN AN ORGANIZED HEALTH CARE EDUCATION/TRAINING PROGRAM

## 2021-09-29 PROCEDURE — 86481 TB AG RESPONSE T-CELL SUSP: CPT | Performed by: STUDENT IN AN ORGANIZED HEALTH CARE EDUCATION/TRAINING PROGRAM

## 2021-09-29 PROCEDURE — 90471 IMMUNIZATION ADMIN: CPT | Performed by: STUDENT IN AN ORGANIZED HEALTH CARE EDUCATION/TRAINING PROGRAM

## 2021-09-29 PROCEDURE — 85018 HEMOGLOBIN: CPT | Performed by: STUDENT IN AN ORGANIZED HEALTH CARE EDUCATION/TRAINING PROGRAM

## 2021-09-29 RX ORDER — LORATADINE 10 MG/1
10 TABLET ORAL DAILY
Qty: 90 TABLET | Refills: 3 | Status: SHIPPED | OUTPATIENT
Start: 2021-09-29 | End: 2021-12-28

## 2021-09-29 RX ORDER — NICOTINE POLACRILEX 4 MG/1
20 GUM, CHEWING ORAL DAILY
Qty: 90 TABLET | Refills: 3 | Status: SHIPPED | OUTPATIENT
Start: 2021-09-29 | End: 2021-12-28

## 2021-09-29 RX ORDER — ACETAMINOPHEN 160 MG
1 TABLET,DISINTEGRATING ORAL DAILY
Qty: 90 CAPSULE | Refills: 3 | Status: SHIPPED | OUTPATIENT
Start: 2021-09-29 | End: 2021-12-28

## 2021-09-29 NOTE — PROGRESS NOTES
Assessment & Plan     Sakina was seen today for letter request and imm/inj.    Diagnoses and all orders for this visit:    Screening examination for pulmonary tuberculosis  -     Quantiferon TB Gold Plus; Future  -     Quantiferon TB Gold Plus  In process    Gastroesophageal reflux disease without esophagitis  -     omeprazole 20 MG tablet; Take 1 tablet (20 mg) by mouth daily Take 30-60 minutes before a meal.  Refilled     Vitamin D deficiency  -     Cholecalciferol (VITAMIN D3) 50 MCG (2000 UT) CAPS; Take 1 tablet by mouth daily  Refilled     Healthcare maintenance  -     loratadine (CLARITIN) 10 MG tablet; Take 1 tablet (10 mg) by mouth daily  Refilled     Chronic pain of both shoulders  -     [facility] Referral; Future  Continued pain. Not at goal. Is interested in PT. Referred.    Fatigue, unspecified type  -     Glucose; Future  -     Hemoglobin; Future  -     Glucose  -     Hemoglobin  Hgb normal. Glucose in process.     Need for prophylactic vaccination and inoculation against influenza  -     INFLUENZA QUAD, RECOMBINANT, P-FREE (RIV4) (FLUBLOK)  Given today.      Return for Physical Exam wiht PCP.    Axel El [facility]    Misael Presley is a 56 year old who presents for the following health issues     HPI     Checking in for TB screening as well as med refills and other labs.          Objective    /84   Pulse 81   Temp 98.5  F (36.9  C) (Oral)   Resp 16   SpO2 98%   There is no height or weight on file to calculate BMI.  Physical Exam   General: Alert and oriented, in no acute distress.  Skin: Warm and dry, no abnormalities noted.  Eyes: Extra-ocular muscles grossly intact, pupils equal.  ENT: Speech intact, nasal passages open, no hearing impairment noted.  CV: No cyanosis or pallor, warm and well perfused.  Respiratory: No respiratory distress, no accessory muscle use.  Neuro: Gait and station normal, comprehension intact. Gross and fine motor  skills intact.   Psychiatric: Mood and affect appear normal.   Extremities: Warm, able to move all four extremities at will.       Results from this visit  Results for orders placed or performed in visit on 09/29/21   Hemoglobin     Status: Normal   Result Value Ref Range    Hemoglobin 12.7 11.7 - 15.7 g/dL   Quantiferon TB Gold Plus     Status: None (In process)    Specimen: Peripheral Blood    Narrative    The following orders were created for panel order Quantiferon TB Gold Plus.  Procedure                               Abnormality         Status                     ---------                               -----------         ------                     Quantiferon TB Gold Plus...[[id number]]                      In process                 Quantiferon TB Gold Plus...[[id number]]                      In process                 Quantiferon TB Gold Plus...[[id number]]                      In process                 Quantiferon TB Gold Plus...[[id number]]                      In process                   Please view results for these tests on the individual orders.

## 2021-09-29 NOTE — LETTER
October 1, 2021    Re: Sakina Banuelos    To Whom it may concern:    TB test is negative.       uantiferon TB Gold Plus Grey Tube   Result Value Ref Range    Quantiferon Nil Tube 0.04 IU/mL   Quantiferon TB Gold Plus Green Tube   Result Value Ref Range    Quantiferon TB1 Tube 0.28 IU/mL   Quantiferon TB Gold Plus Yellow Tube   Result Value Ref Range    Quantiferon TB2 Tube 0.19    Quantiferon TB Gold Plus Purple Tube   Result Value Ref Range    Quantiferon Mitogen 10.00 IU/mL   Quantiferon TB Gold Plus   Result Value Ref Range    Quantiferon-TB Gold Plus Negative Negative      Comment:      No interferon gamma response to M.tuberculosis antigens was detected. Infection with M.tuberculosis is unlikely, however a single negative result does not exclude infection. In patients at high risk for infection, a second test should be considered in accordance with the 2017 ATS/IDSA/CDC Clinical Pract  ice Guidelines for Diagnosis of Tuberculosis in Adults and Children     TB1 Ag minus Nil Value 0.24 IU/mL    TB2 Ag minus Nil Value 0.15 IU/mL    Mitogen minus Nil Result 9.96 IU/mL    Nil Result 0.04 IU/mL       If you have any questions or concerns, please call the clinic at the number listed above.     Sincerely,        Axel El, DO

## 2021-09-30 LAB
GAMMA INTERFERON BACKGROUND BLD IA-ACNC: 0.04 IU/ML
M TB IFN-G BLD-IMP: NEGATIVE
M TB IFN-G CD4+ BCKGRND COR BLD-ACNC: 9.96 IU/ML
MITOGEN IGNF BCKGRD COR BLD-ACNC: 0.15 IU/ML
MITOGEN IGNF BCKGRD COR BLD-ACNC: 0.24 IU/ML
QUANTIFERON MITOGEN: 10 IU/ML
QUANTIFERON NIL TUBE: 0.04 IU/ML
QUANTIFERON TB1 TUBE: 0.28 IU/ML
QUANTIFERON TB2 TUBE: 0.19

## 2021-12-28 ENCOUNTER — OFFICE VISIT (OUTPATIENT)
Dept: FAMILY MEDICINE | Facility: CLINIC | Age: 56
End: 2021-12-28
Payer: COMMERCIAL

## 2021-12-28 VITALS
DIASTOLIC BLOOD PRESSURE: 89 MMHG | OXYGEN SATURATION: 96 % | SYSTOLIC BLOOD PRESSURE: 140 MMHG | HEART RATE: 73 BPM | RESPIRATION RATE: 16 BRPM | TEMPERATURE: 98 F

## 2021-12-28 DIAGNOSIS — Z00.00 PREVENTATIVE HEALTH CARE: ICD-10-CM

## 2021-12-28 DIAGNOSIS — R03.0 ELEVATED BLOOD PRESSURE READING WITHOUT DIAGNOSIS OF HYPERTENSION: Primary | ICD-10-CM

## 2021-12-28 PROCEDURE — 99213 OFFICE O/P EST LOW 20 MIN: CPT | Mod: GC | Performed by: STUDENT IN AN ORGANIZED HEALTH CARE EDUCATION/TRAINING PROGRAM

## 2021-12-28 RX ORDER — ACETAMINOPHEN 500 MG
500-1000 TABLET ORAL EVERY 6 HOURS PRN
Qty: 60 TABLET | Refills: 0 | Status: SHIPPED | OUTPATIENT
Start: 2021-12-28 | End: 2022-01-18

## 2021-12-28 NOTE — PATIENT INSTRUCTIONS
"Patient Education   Here is the plan from today's visit    1. Elevated blood pressure reading without diagnosis of hypertension  1. Return to clinic for your scheduled appointment with Dr. Lee on 1/18 at 8AM.  2. Lifestyle modifications including diet and exercise, more information in this AVS packet.   3. Try the DASH diet! Information included in this packet, also online, search \"DASH diet.\"    Please call or return to clinic if your symptoms don't go away.    Follow up plan  No follow-ups on file.    Thank you for coming to [facility] today.  Lab Testing:  **If you had lab testing today and your results are reassuring or normal they will be mailed to you or sent through [email or web address] within 7 days.   **If the lab tests need quick action we will call you with the results.  **If you are having labs done on a different day, please call [phone] to schedule at [facility] or [phone] for other [facility] Outpatient Lab locations. Labs do not offer walk-in appointments.  The phone number we will call with results is # [phone] (home) . If this is not the best number please call our clinic and change the number.  Medication Refills:  If you need any refills please call your pharmacy and they will contact us.   If you need to  your refill at a new pharmacy, please contact the new pharmacy directly. The new pharmacy will help you get your medications transferred faster.   Scheduling:  If you have any concerns about today's visit or wish to schedule another appointment please call our office during normal business hours [phone] (8-5:00 M-F)  If a referral was made to an [facility] specialty provider and you do not get a call from central scheduling, please refer to directions on your visit summary or call our office during normal business hours for assistance.   If a Mammogram was ordered for you at the [facility] call [phone] to schedule or change your appointment.  If you " had an XRay/CT/Ultrasound/MRI ordered the number is [phone] to schedule or change your radiology appointment.   [facility] has limited ultrasound appointments available on Wednesdays, if you would like your ultrasound at [facility], please call [phone] to schedule.   Medical Concerns:  If you have urgent medical concerns please call [phone] at any time of the day.    Dyan Rider, DO         Patient Education     Controlling High Blood Pressure   High blood pressure (hypertension) is often called the silent killer. This is because many people who have it, don t know it. It can be very dangerous. High blood pressure can raise your risk of heart attack, stroke, heart disease, and heart failure. Controlling your blood pressure can decrease your risk of these problems. It's important to know the appropriate blood pressure range and remember to check your blood pressure regularly. Doing so can save your life.   Blood pressure measurements are given as 2 numbers. Systolic blood pressure is the upper number. This is the pressure when the heart contracts. Diastolic blood pressure is the lower number. This is the pressure when the heart relaxes between beats.   Blood pressure is categorized as normal, elevated, or stage 1 or stage 2 high blood pressure:     Normal blood pressure is systolic of less than 120 and diastolic of less than 80 (120/80)    Elevated blood pressure is systolic of 120 to 129 and diastolic less than 80    Stage 1 high blood pressure is systolic of 130 to 139 or diastolic between 80 to 89    Stage 2 high blood pressure is when systolic is 140 or higher or the diastolic is 90 or higher  A heart-healthy lifestyle can help you control your blood pressure without medicines. Here are some things you can do to pursue a heart-healthy lifestyle:     Choose heart-healthy foods     Select low-salt, low-fat foods. Limit sodium intake to 2,400 mg per day or the amount suggested by your  healthcare provider.    Limit canned, dried, cured, packaged, and fast foods. These can contain a lot of salt.    Eat 8 to 10 servings of fruits and vegetables every day.    Choose lean meats, fish, or chicken.    Eat whole-grain pasta, brown rice, and beans.    Eat 2 to 3 servings of low-fat or fat-free dairy products.    Ask your doctor about the DASH eating plan. This plan helps reduce blood pressure.    When you go to a restaurant, ask that your meal be prepared with no added salt.    Stay at a healthy weight     Ask your healthcare provider how many calories to eat a day. Then stick to that number.    Ask your healthcare provider what weight range is healthiest for you. If you are overweight, a weight loss of only 3% to 5% of your body weight can help lower blood pressure. Generally, a good weight loss goal is to lose 10% of your body weight in a year.    Limit snacks and sweets.    Get regular exercise.    Get up and get active     Find activities you enjoy that can be done alone or with friends or family. Such activities might include bicycling, dancing, walking, or jogging.    Park farther away from building entrances to walk more.    Use stairs instead of the elevator.    When you can, walk or bike instead of driving.    Offerle leaves, garden, or do household repairs.    Be active at a moderate to vigorous level of physical activity for at least 40 minutes for a minimum of 3 to 4 days a week.     Manage stress     Make time to relax and enjoy life. Find time to laugh.    Communicate your concerns with your loved ones and your healthcare provider.    Visit with family and friends, and keep up with hobbies.    Limit alcohol and quit smoking     Men should have no more than 2 drinks per day.    Women should have no more than 1 drink per day.    Talk with your healthcare provider about quitting smoking. Smoking significantly increases your risk for heart disease and stroke. Ask your healthcare provider about  "community smoking cessation programs and other options.    Medicines  If lifestyle changes aren t enough, your healthcare provider may prescribe high blood pressure medicine. Take all medicines as prescribed. If you have any questions about your medicines, ask your healthcare provider before stopping or changing them.   Adrianna last reviewed this educational content on 6/1/2019 2000-2021 [employer]. All rights reserved. This information is not intended as a substitute for professional medical care. Always follow your healthcare professional's instructions.           DASH diet: Healthy eating to lower your blood pressure  Discover how DASH can help you lower your blood pressure and improve your health.    By [facility] Staff  DASH stands for Dietary Approaches to Stop Hypertension. The DASH diet is a healthy-eating plan designed to help treat or prevent high blood pressure (hypertension).    The DASH diet includes foods that are rich in potassium, calcium and magnesium. These nutrients help control blood pressure. The diet limits foods that are high in sodium, saturated fat and added sugars.    Studies have shown that the DASH diet can lower blood pressure in as little as two weeks. The diet can also lower low-density lipoprotein (LDL or \"bad\") cholesterol levels in the blood. High blood pressure and high LDL cholesterol levels are two major risk factors for heart disease and stroke.    DASH diet and sodium  The DASH diet is lower in sodium than a typical American diet, which can include a whopping 3,400 milligrams (mg) of sodium or more a day.    The standard DASH diet limits sodium to 2,300 mg a day. It meets the recommendation from the Dietary Guidelines for Americans to keep daily sodium intake to less than 2,300 mg a day. That's roughly the amount of sodium in 1 teaspoon of table salt.    A lower sodium version of DASH restricts sodium to 1,500 mg a day. You can choose the version of the diet " that meets your health needs. If you aren't sure what sodium level is right for you, talk to your doctor.    DASH diet: What to eat   The DASH diet is a flexible and balanced eating plan that helps create a heart-healthy eating style for life. It's easy to follow using foods found at your grocery store.    The DASH diet is rich in vegetables, fruits and whole grains. It includes fat-free or low-fat dairy products, fish, poultry, beans and nuts. It limits foods that are high in saturated fat, such as fatty meats and full-fat dairy products.    When following DASH, it is important to choose foods that are:    Rich in potassium, calcium, magnesium, fiber and protein  Low in saturated fat  Low in sodium  DASH diet: Recommended servings  The DASH diet provides daily and weekly nutritional goals. The number of servings you should have depends on your daily calorie needs.    Here's a look at the recommended servings from each food group for a 2,389-twsqppl-f-day DASH diet:    Grains: 6 to 8 servings a day. One serving is one slice bread, 1 ounce dry cereal, or 1/2 cup cooked cereal, rice or pasta.  Vegetables: 4 to 5 servings a day. One serving is 1 cup raw leafy green vegetable, 1/2 cup cut-up raw or cooked vegetables, or 1/2 cup vegetable juice.  Fruits: 4 to 5 servings a day. One serving is one medium fruit, 1/2 cup fresh, frozen or canned fruit, or 1/2 cup fruit juice.  Fat-free or low-fat dairy products: 2 to 3 servings a day. One serving is 1 cup milk or yogurt, or 1 1/2 ounces cheese.  Lean meats, poultry and fish: six 1-ounce servings or fewer a day. One serving is 1 ounce cooked meat, poultry or fish, or 1 egg.  Nuts, seeds and legumes: 4 to 5 servings a week. One serving is 1/3 cup nuts, 2 tablespoons peanut butter, 2 tablespoons seeds, or 1/2 cup cooked legumes (dried beans or peas).  Fats and oils: 2 to 3 servings a day. One serving is 1 teaspoon soft margarine, 1 teaspoon vegetable oil, 1 tablespoon mayonnaise  or 2 tablespoons salad dressing.  Sweets and added sugars: 5 servings or fewer a week. One serving is 1 tablespoon sugar, jelly or jam, 1/2 cup sorbet, or 1 cup lemonade.    Take aim at sodium  The foods at the center of the DASH diet are naturally low in sodium. So just by following the DASH diet, you're likely to lower your intake of sodium.    You can further reduce sodium by:    Using sodium-free spices or flavorings instead of salt  Not adding salt when cooking rice, pasta or hot cereal  Choosing plain fresh, frozen or canned vegetables  Choosing fresh or frozen skinless poultry, fish, and lean cuts of meat  Reading food labels and choosing low-sodium or no-salt-added options  As you cut back on processed, high-sodium foods, you may notice that food tastes different. It may take time for your palate to adjust. But once it does, you may find you prefer the DASH way of eating.

## 2021-12-28 NOTE — PROGRESS NOTES
Assessment & Plan     Elevated blood pressure reading without diagnosis of hypertension  Preventative health care  Patient presents with one instance of elevated blood pressure without established history of hypertension.  Describes headache in the occipital region starting around the same time she checked her blood pressure.  Admits to lack of exercise lately and some weight gain.  Recommended lifestyle changes before trying medications. Made a goal of making it to [place] to walk 3-4 times a week, and making an attempt to follow the DASH diet and limiting her pasta intake.  Physical exam unremarkable.  Denies alarm symptoms including chest pain, shortness of breath, or vision changes.  Has a scheduled appointment with Dr. Lee on 1/18/2022, will recheck blood pressure at that time.   - acetaminophen (TYLENOL) 500 MG tablet  Dispense: 60 tablet; Refill: 0      Return in about 3 weeks (around 1/18/2022) for BP recheck.    Dyan Tereso [facility] QAMAR Presley is a 56 year old who presents for the following health issues     HPI     Concerns over blood pressure  Checked blood pressure yesterday, 160/88 at her work. Went home and rechecked, it was in the 130's. Had a headache since yesterday (posterior, occiput region), pressure behind the eyes. Slept well overnight. Elevated today 145/91.    Does not have a hypertension history. Tried no medications.     No big stressors in life right now besides COVID.     No known exposures.      Denies nasal drainage, chest pain/pressure/tightness, dizziness, arm soreness, vision changes, shortness of breath.    Diet: Doesn't use a lot of salt. Not a lot of fatty foods. Pasta.    Exercise: Used to walk. No current exercise.       Review of Systems   Constitutional, HEENT, cardiovascular, pulmonary, gi and gu systems are negative, except as otherwise noted.      Objective    BP (!) 140/89   Pulse 73   Temp 98  F (36.7  C)  (Oral)   Resp 16   SpO2 96%   There is no height or weight on file to calculate BMI.  Physical Exam   GENERAL: healthy, alert and no distress  NECK: no adenopathy, no asymmetry, masses, or scars and thyroid normal to palpation  RESP: lungs clear to auscultation - no rales, rhonchi or wheezes  CV: regular rate and rhythm, normal S1 S2, no S3 or S4, no murmur, click or rub, no peripheral edema and peripheral pulses strong  ABDOMEN: soft, nontender, no hepatosplenomegaly, no masses and bowel sounds normal  MS: no gross musculoskeletal defects noted, no edema

## 2021-12-28 NOTE — Clinical Note
Sakina checked in with me today, concerned about her blood pressure. She's seeing you on the 18th! I rec'd lifestyle changes and talked about stress with her on this visit.

## 2022-01-18 ENCOUNTER — OFFICE VISIT (OUTPATIENT)
Dept: FAMILY MEDICINE | Facility: CLINIC | Age: 57
End: 2022-01-18
Payer: COMMERCIAL

## 2022-01-18 VITALS
RESPIRATION RATE: 16 BRPM | SYSTOLIC BLOOD PRESSURE: 136 MMHG | DIASTOLIC BLOOD PRESSURE: 87 MMHG | TEMPERATURE: 97.8 F | OXYGEN SATURATION: 100 % | HEART RATE: 69 BPM

## 2022-01-18 DIAGNOSIS — Z23 HIGH PRIORITY FOR 2019-NCOV VACCINE: ICD-10-CM

## 2022-01-18 DIAGNOSIS — K21.9 GASTROESOPHAGEAL REFLUX DISEASE WITHOUT ESOPHAGITIS: ICD-10-CM

## 2022-01-18 DIAGNOSIS — E55.9 VITAMIN D DEFICIENCY: ICD-10-CM

## 2022-01-18 DIAGNOSIS — R73.09 ELEVATED GLUCOSE LEVEL: ICD-10-CM

## 2022-01-18 DIAGNOSIS — Z71.84 COUNSELING FOR TRAVEL: ICD-10-CM

## 2022-01-18 DIAGNOSIS — R03.0 ELEVATED BLOOD PRESSURE READING WITHOUT DIAGNOSIS OF HYPERTENSION: Primary | ICD-10-CM

## 2022-01-18 DIAGNOSIS — Z87.898 HISTORY OF WHEEZING: ICD-10-CM

## 2022-01-18 DIAGNOSIS — R06.02 SOB (SHORTNESS OF BREATH): ICD-10-CM

## 2022-01-18 LAB
ALBUMIN SERPL-MCNC: 3.3 G/DL (ref 3.4–5)
ALP SERPL-CCNC: 56 U/L (ref 40–150)
ALT SERPL W P-5'-P-CCNC: 21 U/L (ref 0–50)
ANION GAP SERPL CALCULATED.3IONS-SCNC: 5 MMOL/L (ref 3–14)
AST SERPL W P-5'-P-CCNC: 21 U/L (ref 0–45)
BILIRUB SERPL-MCNC: 0.4 MG/DL (ref 0.2–1.3)
BUN SERPL-MCNC: 13 MG/DL (ref 7–30)
CALCIUM SERPL-MCNC: 9 MG/DL (ref 8.5–10.1)
CHLORIDE BLD-SCNC: 108 MMOL/L (ref 94–109)
CO2 SERPL-SCNC: 25 MMOL/L (ref 20–32)
CREAT SERPL-MCNC: 0.5 MG/DL (ref 0.52–1.04)
CREAT UR-MCNC: 226 MG/DL
DEPRECATED CALCIDIOL+CALCIFEROL SERPL-MC: 24 UG/L (ref 20–75)
GFR SERPL CREATININE-BSD FRML MDRD: >90 ML/MIN/1.73M2
GLUCOSE BLD-MCNC: 106 MG/DL (ref 70–99)
HBA1C MFR BLD: 5.7 % (ref 0–5.6)
MICROALBUMIN UR-MCNC: 33 MG/L
MICROALBUMIN/CREAT UR: 14.6 MG/G CR (ref 0–25)
POTASSIUM BLD-SCNC: 4 MMOL/L (ref 3.4–5.3)
PROT SERPL-MCNC: 7.7 G/DL (ref 6.8–8.8)
SODIUM SERPL-SCNC: 138 MMOL/L (ref 133–144)

## 2022-01-18 PROCEDURE — 80053 COMPREHEN METABOLIC PANEL: CPT | Performed by: FAMILY MEDICINE

## 2022-01-18 PROCEDURE — 36415 COLL VENOUS BLD VENIPUNCTURE: CPT | Performed by: FAMILY MEDICINE

## 2022-01-18 PROCEDURE — 91306 COVID-19,PF,MODERNA (18+ YRS BOOSTER .25ML): CPT | Performed by: FAMILY MEDICINE

## 2022-01-18 PROCEDURE — 0064A COVID-19,PF,MODERNA (18+ YRS BOOSTER .25ML): CPT | Performed by: FAMILY MEDICINE

## 2022-01-18 PROCEDURE — 83036 HEMOGLOBIN GLYCOSYLATED A1C: CPT | Performed by: FAMILY MEDICINE

## 2022-01-18 PROCEDURE — 99214 OFFICE O/P EST MOD 30 MIN: CPT | Mod: 25 | Performed by: FAMILY MEDICINE

## 2022-01-18 PROCEDURE — 82306 VITAMIN D 25 HYDROXY: CPT | Performed by: FAMILY MEDICINE

## 2022-01-18 PROCEDURE — 82043 UR ALBUMIN QUANTITATIVE: CPT | Performed by: FAMILY MEDICINE

## 2022-01-18 RX ORDER — CIPROFLOXACIN 500 MG/1
500 TABLET, FILM COATED ORAL 2 TIMES DAILY
Qty: 6 TABLET | Refills: 0 | Status: SHIPPED | OUTPATIENT
Start: 2022-01-18 | End: 2022-01-21

## 2022-01-18 RX ORDER — ALBUTEROL SULFATE 90 UG/1
1-2 AEROSOL, METERED RESPIRATORY (INHALATION) EVERY 4 HOURS PRN
Qty: 18 G | Refills: 0 | Status: SHIPPED | OUTPATIENT
Start: 2022-01-18 | End: 2022-03-08

## 2022-01-18 RX ORDER — OMEGA-3 FATTY ACIDS/FISH OIL 300-1000MG
200 CAPSULE ORAL EVERY 4 HOURS PRN
Qty: 60 CAPSULE | Refills: 3 | Status: SHIPPED | OUTPATIENT
Start: 2022-01-18 | End: 2023-05-30

## 2022-01-18 RX ORDER — ACETAMINOPHEN 500 MG
500-1000 TABLET ORAL EVERY 6 HOURS PRN
Qty: 60 TABLET | Refills: 0 | Status: SHIPPED | OUTPATIENT
Start: 2022-01-18 | End: 2023-12-14

## 2022-01-18 RX ORDER — MEFLOQUINE HYDROCHLORIDE 250 MG/1
250 TABLET ORAL
Qty: 20 TABLET | Refills: 0 | Status: SHIPPED | OUTPATIENT
Start: 2022-01-18 | End: 2023-06-15

## 2022-01-18 ASSESSMENT — ENCOUNTER SYMPTOMS
HEMATOCHEZIA: 0
MUSCULOSKELETAL NEGATIVE: 1
RESPIRATORY NEGATIVE: 1
HEMATOLOGIC/LYMPHATIC NEGATIVE: 1
CARDIOVASCULAR NEGATIVE: 1
NAUSEA: 0
CONSTITUTIONAL NEGATIVE: 1
HEARTBURN: 1
ENDOCRINE NEGATIVE: 1
VOMITING: 0
NEUROLOGICAL NEGATIVE: 1

## 2022-01-18 NOTE — LETTER
Date:January 24, 2022      Provider requested that no letter be sent. Do not send.       [facility]

## 2022-01-18 NOTE — LETTER
January 23, 2022      Sakina Banuelos  [address]  [facility] [address]        Dear Sakina,    Thank you for getting your care at [facility]. Please see below for your test results.    - your kidney and liver function are normal.   - your glucose level and a1c levels show that you have prediabetes. This is something we should discuss further.  - your urine protein level is ok.   - your vitamin d level is low/normal. You should take a daily vitamin d supplement.     Resulted Orders   Comprehensive metabolic panel   Result Value Ref Range    Sodium 138 133 - 144 mmol/L    Potassium 4.0 3.4 - 5.3 mmol/L    Chloride 108 94 - 109 mmol/L    Carbon Dioxide (CO2) 25 20 - 32 mmol/L    Anion Gap 5 3 - 14 mmol/L    Urea Nitrogen 13 7 - 30 mg/dL    Creatinine 0.50 (L) 0.52 - 1.04 mg/dL    Calcium 9.0 8.5 - 10.1 mg/dL    Glucose 106 (H) 70 - 99 mg/dL    Alkaline Phosphatase 56 40 - 150 U/L    AST 21 0 - 45 U/L    ALT 21 0 - 50 U/L    Protein Total 7.7 6.8 - 8.8 g/dL    Albumin 3.3 (L) 3.4 - 5.0 g/dL    Bilirubin Total 0.4 0.2 - 1.3 mg/dL    GFR Estimate >90 >60 mL/min/1.73m2      Comment:      Effective December 21, 2021 eGFRcr in adults is calculated using the 2021 CKD-EPI creatinine equation which includes age and gender (Jennifer et al., NEJ, DOI: 10.1056/RXMAhg4085146)   Hemoglobin A1c   Result Value Ref Range    Hemoglobin A1C 5.7 (H) 0.0 - 5.6 %      Comment:      Normal <5.7%   Prediabetes 5.7-6.4%    Diabetes 6.5% or higher     Note: Adopted from ADA consensus guidelines.   Albumin Random Urine Quantitative with Creat Ratio   Result Value Ref Range    Creatinine Urine mg/dL 226 mg/dL    Albumin Urine mg/L 33 mg/L    Albumin Urine mg/g Cr 14.60 0.00 - 25.00 mg/g Cr   Vitamin D deficiency screening   Result Value Ref Range    Vitamin D, Total (25-Hydroxy) 24 20 - 75 ug/L    Narrative    Season, race, dietary intake, and treatment affect the concentration of 25-hydroxy-Vitamin D. Values may decrease  during winter months and increase during summer months. Values 20-29 ug/L may indicate Vitamin D insufficiency and values <20 ug/L may indicate Vitamin D deficiency.    Vitamin D determination is routinely performed by an immunoassay specific for 25 hydroxyvitamin D3.  If an individual is on vitamin D2(ergocalciferol) supplementation, please specify 25 OH vitamin D2 and D3 level determination by LCMSMS test VITD23.         Let's have a follow up visit when you return for your travels.     Sincerely,    Padmini Lee MD

## 2022-01-18 NOTE — PROGRESS NOTES
Assessment & Plan     Elevated blood pressure reading without diagnosis of hypertension  - Comprehensive metabolic panel; Future  - Albumin Random Urine Quantitative with Creat Ratio; Future    Elevated glucose level  - Comprehensive metabolic panel; Future  - Hemoglobin A1c; Future    Vitamin D deficiency  - Vitamin D deficiency screening; Future    Counseling for travel, travel medications  - Mefloquine 250 mg po q 7 days, quantity 20  - ciprofloxacin (CIPRO) 500 MG tablet; Take 1 tablet (500 mg) by mouth 2 times daily for 3 days prn traveler's diarrhea  - ibuprofen (ADVIL/MOTRIN) 200 MG capsule; Take 1 capsule (200 mg) by mouth every 4 hours as needed for fever    Gastroesophageal reflux disease without esophagitis  - refill omeprazole (PRILOSEC) 20 MG DR capsule; Take 1 capsule (20 mg) by mouth daily    High priority for 2019-nCoV vaccine  - COVID-19,PF,MODERNA (18+ Yrs BOOSTER .25mL)    Results to be mailed. Further follow-up upon return.    25 minutes spent on the date of the encounter doing chart review, review of test results, patient visit and documentation        Padmini Lee MD  [facility]  =====================================    Subjective   Sakina is a 56 year old who presents for the following health issues   Chief Complaint   Patient presents with     RECHECK     Following up on medications before [place]       HPI   Will be traveling to [facility], staying in [facility] for all of trip. Mother in ill health. Requesting labs and med refills before she goes. Discussed overdue Moderna booster and agrees to get today. Has had flu vaccine.     Noted to be mildly hypertensive at last visit, does not have diagnosis of HTN. Has checked BP at home since and all below 140/90. Also has had mild elevations of glucose levels previously, no a1c in record.     Review of Systems   Constitutional: Negative.    Eyes: Negative for visual disturbance.   Respiratory: Negative.     Cardiovascular: Negative.    Gastrointestinal: Positive for heartburn. Negative for hematochezia, nausea and vomiting.   Endocrine: Negative.    Genitourinary: Negative.    Musculoskeletal: Negative.    Neurological: Negative.    Hematological: Negative.             Objective    /87   Pulse 69   Temp 97.8  F (36.6  C) (Oral)   Resp 16   SpO2 100%      Physical Exam  Constitutional:       General: She is not in acute distress.     Appearance: She is not ill-appearing.   Neurological:      Mental Status: She is alert.   Psychiatric:         Mood and Affect: Mood normal.         Behavior: Behavior normal.

## 2022-03-08 DIAGNOSIS — Z87.898 HISTORY OF WHEEZING: ICD-10-CM

## 2022-03-08 DIAGNOSIS — R06.02 SOB (SHORTNESS OF BREATH): ICD-10-CM

## 2022-03-08 RX ORDER — ALBUTEROL SULFATE 90 UG/1
1-2 AEROSOL, METERED RESPIRATORY (INHALATION) EVERY 4 HOURS PRN
Qty: 18 G | Refills: 0 | Status: SHIPPED | OUTPATIENT
Start: 2022-03-08 | End: 2023-08-23

## 2023-01-16 ENCOUNTER — ANCILLARY PROCEDURE (OUTPATIENT)
Dept: MAMMOGRAPHY | Facility: CLINIC | Age: 58
End: 2023-01-16
Attending: FAMILY MEDICINE
Payer: COMMERCIAL

## 2023-01-16 DIAGNOSIS — Z12.31 ENCOUNTER FOR SCREENING MAMMOGRAM FOR MALIGNANT NEOPLASM OF BREAST: ICD-10-CM

## 2023-01-16 DIAGNOSIS — Z12.31 VISIT FOR SCREENING MAMMOGRAM: ICD-10-CM

## 2023-01-16 PROCEDURE — 77063 BREAST TOMOSYNTHESIS BI: CPT | Mod: GC | Performed by: RADIOLOGY

## 2023-01-16 PROCEDURE — 77067 SCR MAMMO BI INCL CAD: CPT | Mod: GC | Performed by: RADIOLOGY

## 2023-05-04 NOTE — PATIENT INSTRUCTIONS
Problem: Pain  Goal: #Acceptable pain level achieved/maintained at rest using NRS/Faces  Description: This goal is used for patients who can self-report.  Acceptable means the level is at or below the identified comfort/function goal.  Outcome: Outcome Met, Continue evaluating goal progress toward completion  Goal: # Acceptable pain level achieved/maintained with activity using NRS/Faces  Description: This goal is used for patients who can self-report and are not achieving acceptable pain control during activity.  Outcome: Outcome Met, Continue evaluating goal progress toward completion  Goal: Acceptable pain/comfort level is achieved/maintained at rest (based on Pain Behaviors Scale)  Description: This goal is used for patients who are not able to self-report pain and are assessed for pain using the Pain Behaviors Scale  Outcome: Outcome Met, Continue evaluating goal progress toward completion  Goal: # Verbalizes understanding of pain management  Description: Documented in Patient Education Activity  Outcome: Outcome Met, Continue evaluating goal progress toward completion     Problem: At Risk for Falls  Goal: # Patient does not fall  Outcome: Outcome Met, Continue evaluating goal progress toward completion  Goal: # Takes action to control fall-related risks  Outcome: Outcome Met, Continue evaluating goal progress toward completion  Goal: # Verbalizes understanding of fall risk/precautions  Description: Document education using the patient education activity  Outcome: Outcome Met, Continue evaluating goal progress toward completion     Problem: At Risk for Injury Due to Fall  Goal: # Patient does not fall  Outcome: Outcome Met, Continue evaluating goal progress toward completion  Goal: # Takes action to control condition specific risks  Outcome: Outcome Met, Continue evaluating goal progress toward completion  Goal: # Verbalizes understanding of fall-related injury personal risks  Description: Document education  Here is the plan from today's visit    1. Chronic left-sided cervical and low back pain without sciatica  Likely related to lack of exercise and muscular dehydration along with poor posture.  Would highly recommend physical therapy to strengthen the upper and lower back along with the core.  If there is no improvement with physical therapy we will consider a muscle relaxer.  - PHYSICAL THERAPY REFERRAL - EXTERNAL; Future    2. Chronic fatigue  Very nonspecific findings however due to chronicity we will do complete work-up including the following listed below.  - TSH- future  - Lipid Cascade ([facility]); Future  - Comprehensive Metabolic Panel ([facility]); Future  - CBC with Plt ([facility]); Future  - Hemoglobin A1c ([facility]); Future    3. Mantoux: positive  However due to reader dependence and patient concerned about false positive we will perform a QuantiFERON gold prior to performing a chest x-ray.  Patient denies any symptoms at this time and is planning to return to clinic for lab only visit when she is not fasting.  - Quantiferon TB Gold Plus; Future      Please call or return to clinic if your symptoms don't go away.    Follow up plan  Please make a clinic appointment for follow up with me after drawing labs to review.     Thank you for coming to [facility] today.  Lab Testing:  **If you had lab testing today and your results are reassuring or normal they will be mailed to you or sent through [facility] within 7 days.   **If the lab tests need quick action we will call you with the results.  The phone number we will call with results is # [phone] (home) . If this is not the best number please call our clinic and change the number.  Medication Refills:  If you need any refills please call your pharmacy and they will contact us.   If you need to  your refill at a new pharmacy, please contact the new pharmacy directly. The new pharmacy will help you get your medications transferred faster.    Scheduling:  If you have any concerns about today's visit or wish to schedule another appointment please call our office during normal business hours [phone] (8-5:00 M-F)  If a referral was made to a [facility] and you don't get a call from central scheduling please call [phone].  If a Mammogram was ordered for you at [facility] call [phone] to schedule or change your appointment.  If you had an XRay/CT/Ultrasound/MRI ordered the number is [phone] to schedule or change your radiology appointment.   Medical Concerns:  If you have urgent medical concerns please call [phone] at any time of the day.    Chanell Chiadez MD     using the patient education activity  Outcome: Outcome Met, Continue evaluating goal progress toward completion     Problem: VTE, Risk for  Goal: # No s/s of VTE  Outcome: Outcome Met, Continue evaluating goal progress toward completion  Goal: # Verbalizes understanding of VTE risk factors and prevention  Description: Document education using the patient education activity.   Outcome: Outcome Met, Continue evaluating goal progress toward completion  Goal: Demonstrates ability to administer injectable anticoagulants if ordered for d/c  Description: Document education using the patient education activity.  Outcome: Outcome Met, Continue evaluating goal progress toward completion     Problem: VTE (Actual)  Goal: # Verbalizes understanding of VTE and treatment plan  Description: Document education using the patient education activity.   Outcome: Outcome Met, Continue evaluating goal progress toward completion

## 2023-05-30 ENCOUNTER — OFFICE VISIT (OUTPATIENT)
Dept: FAMILY MEDICINE | Facility: CLINIC | Age: 58
End: 2023-05-30
Payer: COMMERCIAL

## 2023-05-30 VITALS
RESPIRATION RATE: 18 BRPM | TEMPERATURE: 98.7 F | DIASTOLIC BLOOD PRESSURE: 83 MMHG | SYSTOLIC BLOOD PRESSURE: 127 MMHG | OXYGEN SATURATION: 100 % | HEART RATE: 66 BPM

## 2023-05-30 DIAGNOSIS — N89.8 VAGINAL DISCHARGE: ICD-10-CM

## 2023-05-30 DIAGNOSIS — K21.9 GASTROESOPHAGEAL REFLUX DISEASE WITHOUT ESOPHAGITIS: ICD-10-CM

## 2023-05-30 DIAGNOSIS — Z11.51 SCREENING FOR HUMAN PAPILLOMAVIRUS: ICD-10-CM

## 2023-05-30 DIAGNOSIS — R30.0 DYSURIA: ICD-10-CM

## 2023-05-30 DIAGNOSIS — G89.29 CHRONIC PAIN OF BOTH SHOULDERS: ICD-10-CM

## 2023-05-30 DIAGNOSIS — J30.2 SEASONAL ALLERGIES: ICD-10-CM

## 2023-05-30 DIAGNOSIS — R10.30 LOWER ABDOMINAL PAIN: Primary | ICD-10-CM

## 2023-05-30 DIAGNOSIS — M25.511 CHRONIC PAIN OF BOTH SHOULDERS: ICD-10-CM

## 2023-05-30 DIAGNOSIS — K59.00 CONSTIPATION, UNSPECIFIED CONSTIPATION TYPE: ICD-10-CM

## 2023-05-30 DIAGNOSIS — M25.512 CHRONIC PAIN OF BOTH SHOULDERS: ICD-10-CM

## 2023-05-30 LAB
ALBUMIN UR-MCNC: NEGATIVE MG/DL
APPEARANCE UR: ABNORMAL
BACTERIA #/AREA URNS HPF: ABNORMAL /HPF
BILIRUB UR QL STRIP: ABNORMAL
CLUE CELLS: NORMAL
COLOR UR AUTO: ABNORMAL
GLUCOSE UR STRIP-MCNC: NEGATIVE MG/DL
HGB UR QL STRIP: NEGATIVE
KETONES UR STRIP-MCNC: NEGATIVE MG/DL
LEUKOCYTE ESTERASE UR QL STRIP: ABNORMAL
NITRATE UR QL: NEGATIVE
PH UR STRIP: 5 [PH] (ref 5–8)
RBC #/AREA URNS AUTO: ABNORMAL /HPF
SP GR UR STRIP: >=1.03 (ref 1–1.03)
SQUAMOUS #/AREA URNS AUTO: ABNORMAL /LPF
TRICHOMONAS, WET PREP: NORMAL
UROBILINOGEN UR STRIP-ACNC: 0.2 E.U./DL
WBC #/AREA URNS AUTO: ABNORMAL /HPF
WBC'S/HIGH POWER FIELD, WET PREP: NORMAL
YEAST, WET PREP: NORMAL

## 2023-05-30 PROCEDURE — 87210 SMEAR WET MOUNT SALINE/INK: CPT

## 2023-05-30 PROCEDURE — 88175 CYTOPATH C/V AUTO FLUID REDO: CPT

## 2023-05-30 PROCEDURE — 81001 URINALYSIS AUTO W/SCOPE: CPT

## 2023-05-30 PROCEDURE — 87624 HPV HI-RISK TYP POOLED RSLT: CPT

## 2023-05-30 PROCEDURE — 99214 OFFICE O/P EST MOD 30 MIN: CPT | Mod: GC

## 2023-05-30 RX ORDER — OMEGA-3 FATTY ACIDS/FISH OIL 300-1000MG
200 CAPSULE ORAL EVERY 4 HOURS PRN
Qty: 60 CAPSULE | Refills: 3 | Status: SHIPPED | OUTPATIENT
Start: 2023-05-30

## 2023-05-30 RX ORDER — LORATADINE 10 MG/1
10 TABLET ORAL DAILY
Qty: 30 TABLET | Refills: 3 | Status: SHIPPED | OUTPATIENT
Start: 2023-05-30

## 2023-05-30 NOTE — PROGRESS NOTES
Assessment & Plan     Lower abdominal pain  Dysuria  Vaginal discharge  Constipation, unspecified constipation type  Onset: 1 week ago. UA was bland and wet prep was negative. Called Sakina and informed her of the results. She said she has been drinking more water, which has made her feel better. Also notes that she has had very small, hard stools and thinks this may be constipation. Prescribed senna and miralax.  - UA with Microscopic reflex to Culture; Future  - UA with Microscopic reflex to Culture  - UA Microscopic with Reflex to Culture  - Wet preparation - Clinic Collect  - polyethylene glycol (MIRALAX) 17 GM/Dose powder; Take 17 g (1 Capful) by mouth daily Stop if you develop diarrhea.  - SENNA-docusate sodium (SENNA S) 8.6-50 MG tablet; Take 1 tablet by mouth At Bedtime Stop if you develop diarrhea.    Screening for human papillomavirus  Results are negative.  - Pap screen with HPV - recommended age 30 - 65 years  - HPV Hold (Lab Only)  - HPV High Risk Types DNA Cervical    Gastroesophageal reflux disease without esophagitis  Refill requested: refilled.  - omeprazole (PRILOSEC) 20 MG DR capsule; Take 1 capsule (20 mg) by mouth daily    Seasonal allergies  Refill requested: refilled.  - loratadine (CLARITIN) 10 MG tablet; Take 1 tablet (10 mg) by mouth daily    Chronic pain of both shoulders  Refill requested: refilled.  - ibuprofen (ADVIL/MOTRIN) 200 MG capsule; Take 1 capsule (200 mg) by mouth every 4 hours as needed for fever    Return if symptoms worsen or fail to improve.    Violet Humphrey DO, MPH  [facility] QAMAR Presley is a 58 year old, presenting for the following health issues:  possible UTI        5/30/2023     1:53 PM   Additional Questions   Roomed by karina   Accompanied by self     HPI   - Abdominal pain for 1 week  - No diarrhea, constipation  - Mild dysuria and urinary urgency, no urinary frequency  - Increase discharge, but no odor, vaginal itching, or  vaginal irritation  - Stools have been small and hard    Review of Systems   Constitutional, HEENT, cardiovascular, pulmonary, gi and gu systems are negative, except as otherwise noted.      Objective    /83   Pulse 66   Temp 98.7  F (37.1  C)   Resp 18   SpO2 100%   There is no height or weight on file to calculate BMI.  Physical Exam  Vitals reviewed.   Constitutional:       Appearance: Normal appearance.   HENT:      Head: Normocephalic and atraumatic.      Nose: Nose normal.      Mouth/Throat:      Mouth: Mucous membranes are moist.      Pharynx: Oropharynx is clear.   Eyes:      Extraocular Movements: Extraocular movements intact.      Conjunctiva/sclera: Conjunctivae normal.   Abdominal:      General: There is no distension.      Palpations: Abdomen is soft.      Tenderness: There is abdominal tenderness (RLQ, LLQ, suprapubic).   Genitourinary:     General: Normal vulva.      Vagina: Normal. No vaginal discharge, tenderness or bleeding.      Cervix: Normal.      Uterus: Normal.       Adnexa: Right adnexa normal and left adnexa normal.   Musculoskeletal:         General: Normal range of motion.   Skin:     General: Skin is warm and dry.   Neurological:      Mental Status: She is alert and oriented to person, place, and time.   Psychiatric:         Mood and Affect: Mood normal.        ----- Service Performed and Documented by Resident or Fellow ------

## 2023-05-31 RX ORDER — POLYETHYLENE GLYCOL 3350 17 G/17G
1 POWDER, FOR SOLUTION ORAL DAILY
Qty: 289 G | Refills: 0 | Status: SHIPPED | OUTPATIENT
Start: 2023-05-31 | End: 2023-12-14

## 2023-05-31 RX ORDER — SENNA AND DOCUSATE SODIUM 50; 8.6 MG/1; MG/1
1 TABLET, FILM COATED ORAL AT BEDTIME
Qty: 30 TABLET | Refills: 0 | Status: SHIPPED | OUTPATIENT
Start: 2023-05-31 | End: 2023-12-14

## 2023-06-02 LAB
BKR LAB AP GYN ADEQUACY: NORMAL
BKR LAB AP GYN INTERPRETATION: NORMAL
BKR LAB AP HPV REFLEX: NORMAL
BKR LAB AP PREVIOUS ABNORMAL: NORMAL
PATH REPORT.COMMENTS IMP SPEC: NORMAL
PATH REPORT.COMMENTS IMP SPEC: NORMAL
PATH REPORT.RELEVANT HX SPEC: NORMAL

## 2023-06-06 NOTE — PATIENT INSTRUCTIONS
Patient Education   Here is the plan from today's visit    1. Lower abdominal pain    2. Dysuria  - UA with Microscopic reflex to Culture; Future  - UA with Microscopic reflex to Culture  - UA Microscopic with Reflex to Culture    3. Vaginal discharge  - Wet preparation - Clinic Collect    4. Screening for human papillomavirus  - Pap screen with HPV - recommended age 30 - 65 years  - HPV Hold (Lab Only)  - HPV High Risk Types DNA Cervical    5. Constipation, unspecified constipation type  - polyethylene glycol (MIRALAX) 17 GM/Dose powder; Take 17 g (1 Capful) by mouth daily Stop if you develop diarrhea.  Dispense: 289 g; Refill: 0  - SENNA-docusate sodium (SENNA S) 8.6-50 MG tablet; Take 1 tablet by mouth At Bedtime Stop if you develop diarrhea.  Dispense: 30 tablet; Refill: 0    6. Gastroesophageal reflux disease without esophagitis  - omeprazole (PRILOSEC) 20 MG DR capsule; Take 1 capsule (20 mg) by mouth daily  Dispense: 90 capsule; Refill: 3    7. Seasonal allergies  - loratadine (CLARITIN) 10 MG tablet; Take 1 tablet (10 mg) by mouth daily  Dispense: 30 tablet; Refill: 3    8. Chronic pain of both shoulders  - ibuprofen (ADVIL/MOTRIN) 200 MG capsule; Take 1 capsule (200 mg) by mouth every 4 hours as needed for fever  Dispense: 60 capsule; Refill: 3    Please call or return to clinic if your symptoms don't go away.    Follow up plan  Return if symptoms worsen or fail to improve.    Thank you for coming to [facility] today.  Lab Testing:  **If you had lab testing today and your results are reassuring or normal they will be mailed to you or sent through [email or web address] within 7 days.   **If the lab tests need quick action we will call you with the results.  **If you are having labs done on a different day, please call [phone] to schedule at [facility] or [phone] for other [facility] locations. Labs do not offer walk-in appointments.  The phone number we will call with results is #  [phone] (home) . If this is not the best number please call our clinic and change the number.  Medication Refills:  If you need any refills please call your pharmacy and they will contact us.   If you need to  your refill at a new pharmacy, please contact the new pharmacy directly. The new pharmacy will help you get your medications transferred faster.   Scheduling:  If you have any concerns about today's visit or wish to schedule another appointment please call our office during normal business hours [phone] (8-5:00 M-F). If you can no longer make a scheduled visit, please cancel via [employer] or call us to cancel.   If a referral was made to an [facility] specialty provider and you do not get a call from central scheduling, please refer to directions on your visit summary or call our office during normal business hours for assistance.   If a Mammogram was ordered for you at [facility] call [phone] to schedule or change your appointment.  If you had an XRay/CT/Ultrasound/MRI ordered the number is [phone] to schedule or change your radiology appointment.   [facility] has limited ultrasound appointments available on Wednesdays, if you would like your ultrasound at [facility], please call [phone] to schedule.   Medical Concerns:  If you have urgent medical concerns please call [phone] at any time of the day.    Violet Humphrey, DO

## 2023-06-07 ENCOUNTER — APPOINTMENT (OUTPATIENT)
Dept: CT IMAGING | Facility: CLINIC | Age: 58
End: 2023-06-07
Attending: EMERGENCY MEDICINE
Payer: COMMERCIAL

## 2023-06-07 ENCOUNTER — HOSPITAL ENCOUNTER (EMERGENCY)
Facility: CLINIC | Age: 58
Discharge: HOME | End: 2023-06-07
Attending: EMERGENCY MEDICINE | Admitting: EMERGENCY MEDICINE
Payer: COMMERCIAL

## 2023-06-07 VITALS
OXYGEN SATURATION: 100 % | RESPIRATION RATE: 19 BRPM | HEART RATE: 65 BPM | DIASTOLIC BLOOD PRESSURE: 87 MMHG | SYSTOLIC BLOOD PRESSURE: 139 MMHG
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
## 2023-06-07 LAB
CREAT BLD-MCNC: 0.4 MG/DL (ref 0.5–1)
GFR SERPL CREATININE-BSD FRML MDRD: >60 ML/MIN/1.73M2
HUMAN PAPILLOMA VIRUS 16 DNA: NEGATIVE
HUMAN PAPILLOMA VIRUS 18 DNA: NEGATIVE
HUMAN PAPILLOMA VIRUS FINAL DIAGNOSIS: NORMAL
HUMAN PAPILLOMA VIRUS OTHER HR: NEGATIVE

## 2023-06-07 PROCEDURE — 70498 CT ANGIOGRAPHY NECK: CPT

## 2023-06-07 PROCEDURE — 70496 CT ANGIOGRAPHY HEAD: CPT

## 2023-06-07 PROCEDURE — 72125 CT NECK SPINE W/O DYE: CPT

## 2023-06-07 PROCEDURE — 70450 CT HEAD/BRAIN W/O DYE: CPT | Mod: XS

## 2023-06-07 PROCEDURE — 99283 EMERGENCY DEPT VISIT LOW MDM: CPT | Performed by: EMERGENCY MEDICINE

## 2023-06-07 PROCEDURE — 70498 CT ANGIOGRAPHY NECK: CPT | Mod: 26 | Performed by: RADIOLOGY

## 2023-06-07 PROCEDURE — 70450 CT HEAD/BRAIN W/O DYE: CPT | Mod: 26 | Performed by: RADIOLOGY

## 2023-06-07 PROCEDURE — 70496 CT ANGIOGRAPHY HEAD: CPT | Mod: 26 | Performed by: RADIOLOGY

## 2023-06-07 PROCEDURE — 82565 ASSAY OF CREATININE: CPT

## 2023-06-07 PROCEDURE — 250N000013 HC RX MED GY IP 250 OP 250 PS 637: Performed by: EMERGENCY MEDICINE

## 2023-06-07 PROCEDURE — 999N000128 HC STATISTIC PERIPHERAL IV START W/O US GUIDANCE

## 2023-06-07 PROCEDURE — 72125 CT NECK SPINE W/O DYE: CPT | Mod: 26 | Performed by: RADIOLOGY

## 2023-06-07 PROCEDURE — 250N000011 HC RX IP 250 OP 636: Performed by: EMERGENCY MEDICINE

## 2023-06-07 PROCEDURE — 99285 EMERGENCY DEPT VISIT HI MDM: CPT | Performed by: EMERGENCY MEDICINE

## 2023-06-07 RX ORDER — IOPAMIDOL 755 MG/ML
75 INJECTION, SOLUTION INTRAVASCULAR ONCE
Status: COMPLETED | OUTPATIENT
Start: 2023-06-07 | End: 2023-06-07

## 2023-06-07 RX ORDER — ACETAMINOPHEN 500 MG
1000 TABLET ORAL ONCE
Status: COMPLETED | OUTPATIENT
Start: 2023-06-07 | End: 2023-06-07

## 2023-06-07 RX ADMIN — ACETAMINOPHEN 1000 MG: 500 TABLET, FILM COATED ORAL at 11:32

## 2023-06-07 RX ADMIN — IOPAMIDOL 75 ML: 755 INJECTION, SOLUTION INTRAVENOUS at 14:44

## 2023-06-07 ASSESSMENT — ACTIVITIES OF DAILY LIVING (ADL)
ADLS_ACUITY_SCORE: 33
ADLS_ACUITY_SCORE: 35

## 2023-06-07 NOTE — DISCHARGE INSTRUCTIONS
Please make an appointment to follow up with Your Primary Care Provider in 5-7 days if any concerns.  It is OK to take Tylenol ir Ibuprofen for pain if needed.
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
## 2023-06-15 ENCOUNTER — OFFICE VISIT (OUTPATIENT)
Dept: FAMILY MEDICINE | Facility: CLINIC | Age: 58
End: 2023-06-15
Payer: COMMERCIAL

## 2023-06-15 VITALS
OXYGEN SATURATION: 98 % | SYSTOLIC BLOOD PRESSURE: 135 MMHG | RESPIRATION RATE: 16 BRPM | DIASTOLIC BLOOD PRESSURE: 85 MMHG | HEART RATE: 71 BPM

## 2023-06-15 DIAGNOSIS — S46.002A ROTATOR CUFF INJURY, LEFT, INITIAL ENCOUNTER: ICD-10-CM

## 2023-06-15 DIAGNOSIS — M25.512 ACUTE PAIN OF LEFT SHOULDER: ICD-10-CM
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
## 2023-06-15 DIAGNOSIS — M25.552 HIP PAIN, LEFT: ICD-10-CM

## 2023-06-15 PROCEDURE — 99213 OFFICE O/P EST LOW 20 MIN: CPT | Mod: GC | Performed by: STUDENT IN AN ORGANIZED HEALTH CARE EDUCATION/TRAINING PROGRAM

## 2023-06-15 NOTE — PATIENT INSTRUCTIONS
Good to see you today, Ms. Banuelos.    - You can rotate and switch between ibuprofen and tylenol. Try to take ibuprofen with food. Take 500 mg of tylenol, being sure not to exceed 4000 mg/day.   - Continue icing your neck, shoulder and hip  - PT referral placed   - Feel free to try acupuncture - no contraindications to this.  - Come back for blood pressure recheck and to discuss colon cancer screening
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
## 2023-08-23 DIAGNOSIS — R06.02 SOB (SHORTNESS OF BREATH): ICD-10-CM

## 2023-08-23 DIAGNOSIS — Z87.898 HISTORY OF WHEEZING: ICD-10-CM

## 2023-08-23 RX ORDER — ALBUTEROL SULFATE 90 UG/1
1-2 AEROSOL, METERED RESPIRATORY (INHALATION) EVERY 4 HOURS PRN
Qty: 18 G | Refills: 0 | Status: SHIPPED | OUTPATIENT
Start: 2023-08-23 | End: 2023-12-14

## 2023-12-13 PROBLEM — R73.03 PREDIABETES: Status: ACTIVE | Noted: 2023-12-13

## 2023-12-13 PROBLEM — R10.2 SUPRAPUBIC DISCOMFORT: Status: RESOLVED | Noted: 2021-03-21 | Resolved: 2023-12-13

## 2023-12-14 ENCOUNTER — OFFICE VISIT (OUTPATIENT)
Dept: FAMILY MEDICINE | Facility: CLINIC | Age: 58
End: 2023-12-14
Payer: COMMERCIAL

## 2023-12-14 VITALS
OXYGEN SATURATION: 99 % | DIASTOLIC BLOOD PRESSURE: 87 MMHG | RESPIRATION RATE: 18 BRPM | SYSTOLIC BLOOD PRESSURE: 123 MMHG | TEMPERATURE: 98 F | HEART RATE: 83 BPM

## 2023-12-14 DIAGNOSIS — Z00.00 ROUTINE GENERAL MEDICAL EXAMINATION AT A HEALTH CARE FACILITY: ICD-10-CM

## 2023-12-14 DIAGNOSIS — Z12.31 ENCOUNTER FOR SCREENING MAMMOGRAM FOR MALIGNANT NEOPLASM OF BREAST: ICD-10-CM

## 2023-12-14 DIAGNOSIS — Z13.220 LIPID SCREENING: ICD-10-CM

## 2023-12-14 DIAGNOSIS — Z12.11 SCREEN FOR COLON CANCER: ICD-10-CM

## 2023-12-14 DIAGNOSIS — R06.02 SOB (SHORTNESS OF BREATH): ICD-10-CM

## 2023-12-14 DIAGNOSIS — Z11.59 NEED FOR HEPATITIS C SCREENING TEST: ICD-10-CM

## 2023-12-14 DIAGNOSIS — R03.0 ELEVATED BLOOD PRESSURE READING WITHOUT DIAGNOSIS OF HYPERTENSION: ICD-10-CM

## 2023-12-14 DIAGNOSIS — Z71.85 VACCINE COUNSELING: ICD-10-CM

## 2023-12-14 DIAGNOSIS — Z87.898 HISTORY OF WHEEZING: ICD-10-CM

## 2023-12-14 DIAGNOSIS — K59.00 CONSTIPATION, UNSPECIFIED CONSTIPATION TYPE: ICD-10-CM

## 2023-12-14 DIAGNOSIS — N95.1 PERIMENOPAUSE: ICD-10-CM

## 2023-12-14 DIAGNOSIS — Z00.00 PREVENTATIVE HEALTH CARE: ICD-10-CM

## 2023-12-14 DIAGNOSIS — R73.03 PREDIABETES: Primary | ICD-10-CM

## 2023-12-14 LAB
ALBUMIN SERPL BCG-MCNC: 4 G/DL (ref 3.5–5.2)
ALP SERPL-CCNC: 57 U/L (ref 40–150)
ALT SERPL W P-5'-P-CCNC: 12 U/L (ref 0–50)
ANION GAP SERPL CALCULATED.3IONS-SCNC: 9 MMOL/L (ref 7–15)
AST SERPL W P-5'-P-CCNC: 16 U/L (ref 0–45)
BILIRUB SERPL-MCNC: 0.3 MG/DL
BUN SERPL-MCNC: 9.6 MG/DL (ref 6–20)
CALCIUM SERPL-MCNC: 9.4 MG/DL (ref 8.6–10)
CHLORIDE SERPL-SCNC: 104 MMOL/L (ref 98–107)
CHOLEST SERPL-MCNC: 202 MG/DL
CREAT SERPL-MCNC: 0.52 MG/DL (ref 0.51–0.95)
DEPRECATED HCO3 PLAS-SCNC: 26 MMOL/L (ref 22–29)
EGFRCR SERPLBLD CKD-EPI 2021: >90 ML/MIN/1.73M2
ERYTHROCYTE [DISTWIDTH] IN BLOOD BY AUTOMATED COUNT: 13.7 % (ref 10–15)
FASTING STATUS PATIENT QL REPORTED: ABNORMAL
GLUCOSE SERPL-MCNC: 104 MG/DL (ref 70–99)
HBA1C MFR BLD: 5.4 % (ref 0–5.6)
HCT VFR BLD AUTO: 41.8 % (ref 35–47)
HCV AB SERPL QL IA: NONREACTIVE
HDLC SERPL-MCNC: 56 MG/DL
HGB BLD-MCNC: 13.4 G/DL (ref 11.7–15.7)
HOLD SPECIMEN: NORMAL
LDLC SERPL CALC-MCNC: 130 MG/DL
MCH RBC QN AUTO: 28.4 PG (ref 26.5–33)
MCHC RBC AUTO-ENTMCNC: 32.1 G/DL (ref 31.5–36.5)
MCV RBC AUTO: 89 FL (ref 78–100)
NONHDLC SERPL-MCNC: 146 MG/DL
PLATELET # BLD AUTO: 172 10E3/UL (ref 150–450)
POTASSIUM SERPL-SCNC: 4.1 MMOL/L (ref 3.4–5.3)
PROT SERPL-MCNC: 7.7 G/DL (ref 6.4–8.3)
RBC # BLD AUTO: 4.72 10E6/UL (ref 3.8–5.2)
SODIUM SERPL-SCNC: 139 MMOL/L (ref 135–145)
TRIGL SERPL-MCNC: 78 MG/DL
WBC # BLD AUTO: 3.8 10E3/UL (ref 4–11)

## 2023-12-14 PROCEDURE — 83036 HEMOGLOBIN GLYCOSYLATED A1C: CPT | Performed by: FAMILY MEDICINE

## 2023-12-14 PROCEDURE — 80053 COMPREHEN METABOLIC PANEL: CPT | Performed by: FAMILY MEDICINE

## 2023-12-14 PROCEDURE — 36415 COLL VENOUS BLD VENIPUNCTURE: CPT | Performed by: FAMILY MEDICINE

## 2023-12-14 PROCEDURE — 99396 PREV VISIT EST AGE 40-64: CPT | Performed by: FAMILY MEDICINE

## 2023-12-14 PROCEDURE — 86803 HEPATITIS C AB TEST: CPT | Performed by: FAMILY MEDICINE

## 2023-12-14 PROCEDURE — 80061 LIPID PANEL: CPT | Performed by: FAMILY MEDICINE

## 2023-12-14 PROCEDURE — 85027 COMPLETE CBC AUTOMATED: CPT | Performed by: FAMILY MEDICINE

## 2023-12-14 PROCEDURE — 99213 OFFICE O/P EST LOW 20 MIN: CPT | Mod: 25 | Performed by: FAMILY MEDICINE

## 2023-12-14 RX ORDER — CHOLECALCIFEROL (VITAMIN D3) 50 MCG
1 TABLET ORAL DAILY
Qty: 90 TABLET | Refills: 3 | Status: SHIPPED | OUTPATIENT
Start: 2023-12-14

## 2023-12-14 RX ORDER — ALBUTEROL SULFATE 90 UG/1
2 AEROSOL, METERED RESPIRATORY (INHALATION) EVERY 4 HOURS PRN
Qty: 17 G | Refills: 0 | Status: SHIPPED | OUTPATIENT
Start: 2023-12-14 | End: 2024-08-27

## 2023-12-14 RX ORDER — ACETAMINOPHEN 500 MG
500-1000 TABLET ORAL EVERY 6 HOURS PRN
Qty: 60 TABLET | Refills: 0 | Status: SHIPPED | OUTPATIENT
Start: 2023-12-14 | End: 2024-08-27

## 2023-12-14 RX ORDER — SENNA AND DOCUSATE SODIUM 50; 8.6 MG/1; MG/1
1 TABLET, FILM COATED ORAL AT BEDTIME
Qty: 30 TABLET | Refills: 0 | Status: SHIPPED | OUTPATIENT
Start: 2023-12-14

## 2023-12-14 RX ORDER — CALCIUM CARBONATE 500 MG/1
2 TABLET, CHEWABLE ORAL DAILY
Qty: 100 TABLET | Refills: 4 | Status: SHIPPED | OUTPATIENT
Start: 2023-12-14

## 2023-12-14 RX ORDER — POLYETHYLENE GLYCOL 3350 17 G/17G
1 POWDER, FOR SOLUTION ORAL DAILY
Qty: 289 G | Refills: 0 | Status: CANCELLED | OUTPATIENT
Start: 2023-12-14

## 2023-12-14 ASSESSMENT — ENCOUNTER SYMPTOMS
WEAKNESS: 0
FREQUENCY: 0
JOINT SWELLING: 0
CHILLS: 0
FEVER: 0
EYE PAIN: 0
ABDOMINAL PAIN: 0
ARTHRALGIAS: 0
DYSURIA: 0
NAUSEA: 0
MYALGIAS: 0
BREAST MASS: 0
HEARTBURN: 1
COUGH: 0
PARESTHESIAS: 0
HEADACHES: 0
CONSTIPATION: 0
PALPITATIONS: 0
SHORTNESS OF BREATH: 0
NERVOUS/ANXIOUS: 0
DIARRHEA: 0
HEMATOCHEZIA: 0
DIZZINESS: 0
HEMATURIA: 0
SORE THROAT: 0

## 2023-12-14 NOTE — PROGRESS NOTES
SUBJECTIVE:   Sakina is a 58 year old, presenting for the following:  Physical        12/14/2023     8:54 AM   Additional Questions   Roomed by amparo   Accompanied by self         12/14/2023     8:54 AM   Patient Reported Additional Medications   Patient reports taking the following new medications none       Healthy Habits:     Getting at least 3 servings of Calcium per day:  NO    Bi-annual eye exam:  NO    Dental care twice a year:  Yes    Sleep apnea or symptoms of sleep apnea:  None    Diet:  Regular (no restrictions)    Frequency of exercise:  1 day/week    Duration of exercise:  15-30 minutes    Taking medications regularly:  Yes    Medication side effects:  None    Additional concerns today:  No    Have you ever done Advance Care Planning? (For example, a Health Directive, POLST, or a discussion with a medical provider or your loved ones about your wishes):   No, advance care planning information given to patient to review.  Advanced care planning was discussed at today's visit.    Social History     Tobacco Use    Smoking status: Never    Smokeless tobacco: Never   Substance Use Topics    Alcohol use: No         12/14/2023     8:54 AM   Alcohol Use   Prescreen: >3 drinks/day or >7 drinks/week? No     Reviewed orders with patient.  Reviewed health maintenance and updated orders accordingly - Yes    Breast Cancer Screening:  Any new diagnosis of family breast, ovarian, or bowel cancer? No    FHS-7:       1/16/2023     7:28 AM   Breast CA Risk Assessment (FHS-7)   Did any of your first-degree relatives have breast or ovarian cancer? No   Did any of your relatives have bilateral breast cancer? No   Did any man in your family have breast cancer? No   Did any woman in your family have breast and ovarian cancer? No   Did any woman in your family have breast cancer before age 50 y? No   Do you have 2 or more relatives with breast and/or ovarian cancer? No   Do you have 2 or more relatives with breast and/or bowel  cancer? No       Mammogram Screening: Recommended mammography every 1-2 years with patient discussion and risk factor consideration  Pertinent mammograms are reviewed under the imaging tab.    History of abnormal Pap smear: NO - age 30-65 PAP every 5 years with negative HPV co-testing recommended      Latest Ref Rng & Units 5/30/2023     2:39 PM 9/5/2019     5:15 PM 8/18/2016     9:13 AM   PAP / HPV   PAP  Negative for Intraepithelial Lesion or Malignancy (NILM)      PAP (Historical)   NIL     HPV 16 DNA Negative Negative  Negative  Negative    HPV 18 DNA Negative Negative  Negative  Negative    Other HR HPV Negative Negative  Negative  Negative      Reviewed and updated as needed this visit by clinical staff   Tobacco  Allergies  Meds              Reviewed and updated as needed this visit by Provider                   Review of Systems   Constitutional:  Negative for chills and fever.   HENT:  Negative for congestion, ear pain, hearing loss and sore throat.    Eyes:  Negative for pain and visual disturbance.   Respiratory:  Negative for cough and shortness of breath.    Cardiovascular:  Negative for chest pain, palpitations and peripheral edema.   Gastrointestinal:  Positive for heartburn. Negative for abdominal pain, constipation, diarrhea, hematochezia and nausea.   Breasts:  Negative for tenderness, breast mass and discharge.   Genitourinary:  Negative for dysuria, frequency, genital sores, hematuria, pelvic pain, urgency, vaginal bleeding and vaginal discharge.   Musculoskeletal:  Negative for arthralgias, joint swelling and myalgias.   Skin:  Negative for rash.   Neurological:  Negative for dizziness, weakness, headaches and paresthesias.   Psychiatric/Behavioral:  Negative for mood changes. The patient is not nervous/anxious.      BP Readings from Last 3 Encounters:   12/14/23 123/87   06/15/23 135/85   06/07/23 139/87       OBJECTIVE:   /65 (BP Location: Left arm, Patient Position: Sitting, Cuff  Size: Adult Regular)   Pulse 83   Temp 98  F (36.7  C)   Resp 18   SpO2 99%     Physical Exam  Constitutional:       General: She is not in acute distress.     Appearance: She is not ill-appearing.   HENT:      Right Ear: Tympanic membrane normal.      Left Ear: Tympanic membrane normal.      Nose: Nose normal.      Mouth/Throat:      Mouth: Mucous membranes are moist.      Pharynx: Oropharynx is clear.   Eyes:      Extraocular Movements: Extraocular movements intact.      Conjunctiva/sclera: Conjunctivae normal.      Pupils: Pupils are equal, round, and reactive to light.   Neck:      Thyroid: No thyromegaly.   Cardiovascular:      Rate and Rhythm: Normal rate and regular rhythm.      Heart sounds: Normal heart sounds.   Pulmonary:      Effort: Pulmonary effort is normal.      Breath sounds: Normal breath sounds.   Abdominal:      General: Bowel sounds are normal.      Palpations: Abdomen is soft. There is no mass.      Tenderness: There is no abdominal tenderness.   Musculoskeletal:      Cervical back: Normal range of motion and neck supple.      Right lower leg: No edema.      Left lower leg: No edema.   Lymphadenopathy:      Cervical: No cervical adenopathy.   Skin:     Findings: No rash.   Neurological:      General: No focal deficit present.      Mental Status: She is alert and oriented to person, place, and time.   Psychiatric:         Mood and Affect: Mood normal.         Behavior: Behavior normal.       Diagnostic Test Results:  Labs reviewed in Epic    ASSESSMENT/PLAN:     Routine general medical examination at a health care facility    Screen for colon cancer  -     Fecal colorectal cancer screen FIT    Screen for breast cancer  - mammogram due Jan 2024. Ordered    Need for hepatitis C screening test  -     Hepatitis C Screen Reflex to HCV RNA Quant and Genotype    Perimenopause  -     CBC with Platelets and Reflex to Iron Studies  -     Has [facility] Rx in external med reconciliation. Not prescribed by  me.    Preventative health care - bone health  Heartburn  -     calcium carbonate (TUMS) 500 MG chewable tablet; Take 2 tablets (1,000 mg) by mouth daily  -     vitamin D3 (CHOLECALCIFEROL) 50 mcg (2000 units) tablet; Take 1 tablet (50 mcg) by mouth daily    Lipid screening  -     Lipid panel    Vaccine counseling        -     Declined covid, flu, Dtap, zoster    Prediabetes  -     Hemoglobin A1c  -     Comprehensive metabolic panel    Elevated blood pressure reading without diagnosis of hypertension  External BP readings reported as sometimes higher. Fhx HTN  -     Comprehensive metabolic panel  -      Urine microalbumin    REFILLS  SOB (shortness of breath)  History of wheezing  Only with URI's  -     albuterol (PROAIR HFA) 108 (90 Base) MCG/ACT inhaler; Inhale 2 puffs into the lungs every 4 hours as needed for shortness of breath, wheezing or cough    Pain   -     acetaminophen (TYLENOL) 500 MG tablet; Take 1-2 tablets (500-1,000 mg) by mouth every 6 hours as needed for mild pain (Can take 1, then repeat after 1 hour if pain not resolved.)    Constipation, unspecified constipation type  -     SENNA-docusate sodium (SENNA S) 8.6-50 MG tablet; Take 1 tablet by mouth at bedtime Stop if you develop diarrhea.    Patient has been advised of split billing requirements and indicates understanding: Yes      COUNSELING:  Reviewed preventive health counseling, as reflected in patient instructions    She reports that she has never smoked. She has never used smokeless tobacco.            Padmini Lee MD  [facility]

## 2023-12-14 NOTE — LETTER
December 16, 2023      Sakina Banuelos  [address]  [facility] [address]        Dear Sakina,    Thank you for getting your care at [facility]. Please see below for your test results.    - your screening test for Hepatitis C is negative, as expected.   - your A1c is in the normal range, as we discussed at your visit  - your kidney and liver function are normal  - your cholesterol levels show that your LDL (bad cholesterol) is a bit higher than we like. See recommendations below for diet changes that can lower cholesterol.   - your complete blood count is normal except for very mild decrease in white blood cells. This is unlikely to represent a significant problem but should be rechecked the next time you have labs done.     Resulted Orders   Hepatitis C Screen Reflex to HCV RNA Quant and Genotype   Result Value Ref Range    Hepatitis C Antibody Nonreactive Nonreactive    Narrative    Assay performance characteristics have not been established for newborns, infants, and children.   Hemoglobin A1c   Result Value Ref Range    Hemoglobin A1C 5.4 0.0 - 5.6 %      Comment:      Normal <5.7%   Prediabetes 5.7-6.4%    Diabetes 6.5% or higher     Note: Adopted from ADA consensus guidelines.   Comprehensive metabolic panel   Result Value Ref Range    Sodium 139 135 - 145 mmol/L      Comment:      Reference intervals for this test were updated on 09/26/2023 to more accurately reflect our healthy population. There may be differences in the flagging of prior results with similar values performed with this method. Interpretation of those prior results can be made in the context of the updated reference intervals.     Potassium 4.1 3.4 - 5.3 mmol/L    Carbon Dioxide (CO2) 26 22 - 29 mmol/L    Anion Gap 9 7 - 15 mmol/L    Urea Nitrogen 9.6 6.0 - 20.0 mg/dL    Creatinine 0.52 0.51 - 0.95 mg/dL    GFR Estimate >90 >60 mL/min/1.73m2    Calcium 9.4 8.6 - 10.0 mg/dL    Chloride 104 98 - 107 mmol/L    Glucose 104  (H) 70 - 99 mg/dL    Alkaline Phosphatase 57 40 - 150 U/L      Comment:      Reference intervals for this test were updated on 11/14/2023 to more accurately reflect our healthy population. There may be differences in the flagging of prior results with similar values performed with this method. Interpretation of those prior results can be made in the context of the updated reference intervals.    AST 16 0 - 45 U/L      Comment:      Reference intervals for this test were updated on 6/12/2023 to more accurately reflect our healthy population. There may be differences in the flagging of prior results with similar values performed with this method. Interpretation of those prior results can be made in the context of the updated reference intervals.    ALT 12 0 - 50 U/L      Comment:      Reference intervals for this test were updated on 6/12/2023 to more accurately reflect our healthy population. There may be differences in the flagging of prior results with similar values performed with this method. Interpretation of those prior results can be made in the context of the updated reference intervals.      Protein Total 7.7 6.4 - 8.3 g/dL    Albumin 4.0 3.5 - 5.2 g/dL    Bilirubin Total 0.3 <=1.2 mg/dL   Lipid panel   Result Value Ref Range    Cholesterol 202 (H) <200 mg/dL    Triglycerides 78 <150 mg/dL    Direct Measure HDL 56 >=50 mg/dL    LDL Cholesterol Calculated 130 (H) <=100 mg/dL    Non HDL Cholesterol 146 (H) <130 mg/dL    Patient Fasting > 8hrs? Unknown     Narrative    Cholesterol  Desirable:  <200 mg/dL    Triglycerides  Normal:  Less than 150 mg/dL  Borderline High:  150-199 mg/dL  High:  200-499 mg/dL  Very High:  Greater than or equal to 500 mg/dL    Direct Measure HDL  Female:  Greater than or equal to 50 mg/dL   Male:  Greater than or equal to 40 mg/dL    LDL Cholesterol  Desirable:  <100mg/dL  Above Desirable:  100-129 mg/dL   Borderline High:  130-159 mg/dL   High:  160-189 mg/dL   Very High:  >= 190  "mg/dL    Non HDL Cholesterol  Desirable:  130 mg/dL  Above Desirable:  130-159 mg/dL  Borderline High:  160-189 mg/dL  High:  190-219 mg/dL  Very High:  Greater than or equal to 220 mg/dL   CBC with Platelets and Reflex to Iron Studies   Result Value Ref Range    WBC Count 3.8 (L) 4.0 - 11.0 10e3/uL    RBC Count 4.72 3.80 - 5.20 10e6/uL    Hemoglobin 13.4 11.7 - 15.7 g/dL    Hematocrit 41.8 35.0 - 47.0 %    MCV 89 78 - 100 fL    MCH 28.4 26.5 - 33.0 pg    MCHC 32.1 31.5 - 36.5 g/dL    RDW 13.7 10.0 - 15.0 %    Platelet Count 172 150 - 450 10e3/uL   Extra Green Top (Lithium Heparin) Tube   Result Value Ref Range    Hold Specimen JIC        If you have any concerns about these results please call and leave a message for me or send a [employer] message to the clinic.    Sincerely,    Padmini Lee MD    ---------------------------    What are the good and bad types of cholesterol?  Low-density lipoprotein (LDL) is \"bad\" cholesterol. The more LDL you have in your blood, the higher your risk of heart disease. High-density lipoprotein (HDL) is \"good\" cholesterol. This type lowers your risk of heart disease.    Triglycerides are another type of fat in your blood. People with diabetes and those who are at risk of diabetes tend to have high triglycerides. When you make changes in your lifestyle to improve your cholesterol levels, you want to lower LDL, raise HDL, and lower triglycerides.    What are the best ways to improve my cholesterol?  There are many things you can do to improve your cholesterol, but some things work better than others. These are some of the best changes you can make:    Eat less saturated fats. There are two kinds of saturated fat. One occurs naturally in animal products and some plant foods (such as coconut and palm kernel oil). The other kind is a man-made saturated fat called trans fat. Trans fats are used in margarine and many snack foods. You should limit the amount of natural saturated fats you " "eat, but completely avoid trans fats. Read the ingredients on food labels. If you see \"partially hydrogenated\" or \"hydrogenated\" oils, that means it has trans fats. It is important to remember that a food can have small amounts of trans fats even if the label says it doesn't. The only way to be sure is to read the ingredients.    Eat more unsaturated fats. Most fats in vegetables, grains, and tree nuts are unsaturated. The two kinds of unsaturated fats are monounsaturated and polyunsaturated. These are better for you than saturated fats and should be used to replace the saturated fats you use in cooking as much as possible. For example, you can use olive oil or canola oil in cooking instead of butter.    Eat more nuts. Have a small handful (1 to 2 oz) of almonds, walnuts, hazelnuts, brazil nuts, or pecans once a day instead of some other snacks. Peanuts are not as good for you as these tree nuts. Nuts are high in calories, so be careful not to eat too many.    Eat more high-fiber foods. Good sources include vegetables and whole grains, such as oat bran, whole oats, beans, peas, and flax seed.    Eat more soy protein. Get more protein from plant sources, such as soy, instead of from meat. Tofu and soy protein shakes are two easy ways to add soy to your diet.    Eat more fish. Oily fish, such as salmon, tuna, mackerel, and sardines, are best. Fish that are caught in the wild are better for you than fish that are raised on farms. Have one or two 6-oz servings each week.    What about the Mediterranean diet?  The Mediterranean diet is an eating plan that improves cholesterol and lowers your risk of dying early. A big change for most people is to use olive oil instead of other fats and oils. Other parts of the Mediterranean diet include:    Eating less red meat, dairy products, eggs, and poultry    Eating more fish, tree nuts, vegetables, and whole grains    Can supplements help?  Yes, there are other things you can add " "to your diet that can help your cholesterol:    Plant sterols and stanols. These can be found in fortified spreads, such as Promise Activ. Use up to two tablespoons per day instead of margarine or butter. Benecol chews are another way to add plant stanols to your diet. Benecol spreads contain small amounts of trans fats in addition to stanols, so it's best to make other choices.    Red yeast rice. This is a traditional Chinese seasoning that has effects similar to \"statin\" cholesterol medicines. Some people can even use red yeast rice instead of statin medicines. Talk to your doctor about whether this is a good idea for you.    Fish oil. If you don't eat fish regularly, you can take fish oil supplements with at least 1,000 mg of the fatty acids EPA and DHA.    Does exercise help?  Yes. Aerobic exercise is one of the few things proven to raise HDL. You need to exercise for at least 120 minutes each week to get the most benefit.   "

## 2023-12-14 NOTE — LETTER
December 26, 2023      Sakina Banuelos  [address]  [facility] [address]        Dear Sakina,    Thank you for getting your care at [facility]. Please see below for your test results.    - your glucose is very mildly elevated but your A1c is in normal range. You do not have diabetes.   - your total cholesterol and LDL are above goal. I have attached dietary recommendations that can help lower cholesterol.    - your liver and kidney function are normal     - your stool test is normal    Resulted Orders   Hepatitis C Screen Reflex to HCV RNA Quant and Genotype   Result Value Ref Range    Hepatitis C Antibody Nonreactive Nonreactive    Narrative    Assay performance characteristics have not been established for newborns, infants, and children.   Hemoglobin A1c   Result Value Ref Range    Hemoglobin A1C 5.4 0.0 - 5.6 %      Comment:      Normal <5.7%   Prediabetes 5.7-6.4%    Diabetes 6.5% or higher     Note: Adopted from ADA consensus guidelines.   Fecal colorectal cancer screen FIT   Result Value Ref Range    Occult Blood Screen FIT Negative Negative   Comprehensive metabolic panel   Result Value Ref Range    Sodium 139 135 - 145 mmol/L      Comment:      Reference intervals for this test were updated on 09/26/2023 to more accurately reflect our healthy population. There may be differences in the flagging of prior results with similar values performed with this method. Interpretation of those prior results can be made in the context of the updated reference intervals.     Potassium 4.1 3.4 - 5.3 mmol/L    Carbon Dioxide (CO2) 26 22 - 29 mmol/L    Anion Gap 9 7 - 15 mmol/L    Urea Nitrogen 9.6 6.0 - 20.0 mg/dL    Creatinine 0.52 0.51 - 0.95 mg/dL    GFR Estimate >90 >60 mL/min/1.73m2    Calcium 9.4 8.6 - 10.0 mg/dL    Chloride 104 98 - 107 mmol/L    Glucose 104 (H) 70 - 99 mg/dL    Alkaline Phosphatase 57 40 - 150 U/L      Comment:      Reference intervals for this test were updated on  11/14/2023 to more accurately reflect our healthy population. There may be differences in the flagging of prior results with similar values performed with this method. Interpretation of those prior results can be made in the context of the updated reference intervals.    AST 16 0 - 45 U/L      Comment:      Reference intervals for this test were updated on 6/12/2023 to more accurately reflect our healthy population. There may be differences in the flagging of prior results with similar values performed with this method. Interpretation of those prior results can be made in the context of the updated reference intervals.    ALT 12 0 - 50 U/L      Comment:      Reference intervals for this test were updated on 6/12/2023 to more accurately reflect our healthy population. There may be differences in the flagging of prior results with similar values performed with this method. Interpretation of those prior results can be made in the context of the updated reference intervals.      Protein Total 7.7 6.4 - 8.3 g/dL    Albumin 4.0 3.5 - 5.2 g/dL    Bilirubin Total 0.3 <=1.2 mg/dL   Lipid panel   Result Value Ref Range    Cholesterol 202 (H) <200 mg/dL    Triglycerides 78 <150 mg/dL    Direct Measure HDL 56 >=50 mg/dL    LDL Cholesterol Calculated 130 (H) <=100 mg/dL    Non HDL Cholesterol 146 (H) <130 mg/dL    Patient Fasting > 8hrs? Unknown     Narrative    Cholesterol  Desirable:  <200 mg/dL    Triglycerides  Normal:  Less than 150 mg/dL  Borderline High:  150-199 mg/dL  High:  200-499 mg/dL  Very High:  Greater than or equal to 500 mg/dL    Direct Measure HDL  Female:  Greater than or equal to 50 mg/dL   Male:  Greater than or equal to 40 mg/dL    LDL Cholesterol  Desirable:  <100mg/dL  Above Desirable:  100-129 mg/dL   Borderline High:  130-159 mg/dL   High:  160-189 mg/dL   Very High:  >= 190 mg/dL    Non HDL Cholesterol  Desirable:  130 mg/dL  Above Desirable:  130-159 mg/dL  Borderline High:  160-189 mg/dL  High:   "190-219 mg/dL  Very High:  Greater than or equal to 220 mg/dL   CBC with Platelets and Reflex to Iron Studies   Result Value Ref Range    WBC Count 3.8 (L) 4.0 - 11.0 10e3/uL    RBC Count 4.72 3.80 - 5.20 10e6/uL    Hemoglobin 13.4 11.7 - 15.7 g/dL    Hematocrit 41.8 35.0 - 47.0 %    MCV 89 78 - 100 fL    MCH 28.4 26.5 - 33.0 pg    MCHC 32.1 31.5 - 36.5 g/dL    RDW 13.7 10.0 - 15.0 %    Platelet Count 172 150 - 450 10e3/uL   Extra Green Top (Lithium Heparin) Tube   Result Value Ref Range    Hold Specimen JIC        If you have any concerns about these results please call and leave a message for me or send a [employer] message to the clinic.    Sincerely,    Padmini Lee MD      What are the good and bad types of cholesterol?  Low-density lipoprotein (LDL) is \"bad\" cholesterol. The more LDL you have in your blood, the higher your risk of heart disease. High-density lipoprotein (HDL) is \"good\" cholesterol. This type lowers your risk of heart disease. Triglycerides are another type of fat in your blood. People with diabetes and those who are at risk of diabetes tend to have high triglycerides. When you make changes in your lifestyle to improve your cholesterol levels, you want to lower LDL, raise HDL, and lower triglycerides.    What are the best ways to improve my cholesterol?  There are many things you can do to improve your cholesterol, but some things work better than others. These are some of the best changes you can make:    Eat less saturated fats. There are two kinds of saturated fat. One occurs naturally in animal products and some plant foods (such as coconut and palm kernel oil). The other kind is a man-made saturated fat called trans fat. Trans fats are used in margarine and many snack foods. You should limit the amount of natural saturated fats you eat, but completely avoid trans fats. Read the ingredients on food labels. If you see \"partially hydrogenated\" or \"hydrogenated\" oils, that means it has trans " fats. It is important to remember that a food can have small amounts of trans fats even if the label says it doesn't. The only way to be sure is to read the ingredients.    Eat more unsaturated fats. Most fats in vegetables, grains, and tree nuts are unsaturated. The two kinds of unsaturated fats are monounsaturated and polyunsaturated. These are better for you than saturated fats and should be used to replace the saturated fats you use in cooking as much as possible. For example, you can use olive oil or canola oil in cooking instead of butter.    Eat more nuts. Have a small handful (1 to 2 oz) of almonds, walnuts, hazelnuts, brazil nuts, or pecans once a day instead of some other snacks. Peanuts are not as good for you as these tree nuts. Nuts are high in calories, so be careful not to eat too many.    Eat more high-fiber foods. Good sources include vegetables and whole grains, such as oat bran, whole oats, beans, peas, and flax seed.    Eat more soy protein. Get more protein from plant sources, such as soy, instead of from meat. Tofu and soy protein shakes are two easy ways to add soy to your diet.    Eat more fish. Oily fish, such as salmon, tuna, mackerel, and sardines, are best. Fish that are caught in the wild are better for you than fish that are raised on farms. Have one or two 6-oz servings each week.    What about the Mediterranean diet?  The Mediterranean diet is an eating plan that improves cholesterol and lowers your risk of dying early. A big change for most people is to use olive oil instead of other fats and oils. Other parts of the Mediterranean diet include:    Eating less red meat, dairy products, eggs, and poultry    Eating more fish, tree nuts, vegetables, and whole grains    Drinking wine in moderation.    Can supplements help?  Yes, there are other things you can add to your diet that can help your cholesterol:    Plant sterols and stanols. These can be found in fortified spreads, such as  "Promise Activ. Use up to two tablespoons per day instead of margarine or butter. Benecol chews are another way to add plant stanols to your diet. Benecol spreads contain small amounts of trans fats in addition to stanols, so it's best to make other choices.    Red yeast rice. This is a traditional Chinese seasoning that has effects similar to \"statin\" cholesterol medicines. Some people can even use red yeast rice instead of statin medicines. Talk to your doctor about whether this is a good idea for you.    Fish oil. If you don't eat fish regularly, you can take fish oil supplements with at least 1,000 mg of the fatty acids EPA and DHA.      Does exercise help?  Yes. Aerobic exercise is one of the few things proven to raise HDL. You need to exercise for at least 120 minutes each week to get the most benefit.   "

## 2023-12-25 PROCEDURE — 82274 ASSAY TEST FOR BLOOD FECAL: CPT | Performed by: FAMILY MEDICINE

## 2023-12-26 LAB — HEMOCCULT STL QL IA: NEGATIVE

## 2024-08-27 DIAGNOSIS — K21.9 GASTROESOPHAGEAL REFLUX DISEASE WITHOUT ESOPHAGITIS: ICD-10-CM

## 2024-08-27 DIAGNOSIS — R06.02 SOB (SHORTNESS OF BREATH): ICD-10-CM

## 2024-08-27 DIAGNOSIS — R03.0 ELEVATED BLOOD PRESSURE READING WITHOUT DIAGNOSIS OF HYPERTENSION: ICD-10-CM

## 2024-08-27 RX ORDER — ALBUTEROL SULFATE 90 UG/1
AEROSOL, METERED RESPIRATORY (INHALATION)
Qty: 18 G | Refills: 0 | Status: SHIPPED | OUTPATIENT
Start: 2024-08-27

## 2024-08-27 RX ORDER — PSEUDOEPHED/ACETAMINOPH/DIPHEN 30MG-500MG
TABLET ORAL
Qty: 100 TABLET | Refills: 0 | Status: SHIPPED | OUTPATIENT
Start: 2024-08-27

## 2024-08-27 NOTE — TELEPHONE ENCOUNTER
"Request for medication refill:  omeprazole (PRILOSEC) 20 MG DR capsule     Providers if patient needs an appointment and you are willing to give a one month supply please refill for one month and  send a letter/MyChart using \".SMILLIMITEDREFILL\" .smillimited and route chart to \"[facility] \" (Giving one month refill in non controlled medications is strongly recommended before denial)    If refill has been denied, meaning absolutely no refills without visit, please complete the smart phrase \".smirxrefuse\" and route it to the \"[facility] MED REFILLS\"  pool to inform the patient and the pharmacy.    Vickie Sanchez, CMA      "

## 2024-10-23 ENCOUNTER — OFFICE VISIT (OUTPATIENT)
Dept: FAMILY MEDICINE | Facility: CLINIC | Age: 59
End: 2024-10-23
Payer: COMMERCIAL

## 2024-10-23 VITALS
OXYGEN SATURATION: 99 % | TEMPERATURE: 98.4 F | HEART RATE: 72 BPM | SYSTOLIC BLOOD PRESSURE: 126 MMHG | RESPIRATION RATE: 17 BRPM | DIASTOLIC BLOOD PRESSURE: 86 MMHG

## 2024-10-23 DIAGNOSIS — R51.9 ACUTE NONINTRACTABLE HEADACHE, UNSPECIFIED HEADACHE TYPE: ICD-10-CM

## 2024-10-23 DIAGNOSIS — R11.0 NAUSEA: ICD-10-CM

## 2024-10-23 DIAGNOSIS — G44.219 EPISODIC TENSION-TYPE HEADACHE, NOT INTRACTABLE: ICD-10-CM

## 2024-10-23 DIAGNOSIS — R73.09 ELEVATED GLUCOSE: ICD-10-CM

## 2024-10-23 DIAGNOSIS — Z78.9 RECENT FOREIGN TRAVEL: ICD-10-CM

## 2024-10-23 DIAGNOSIS — R19.7 DIARRHEA, UNSPECIFIED TYPE: Primary | ICD-10-CM

## 2024-10-23 LAB
ALBUMIN SERPL BCG-MCNC: 4 G/DL (ref 3.5–5.2)
ALP SERPL-CCNC: 57 U/L (ref 40–150)
ALT SERPL W P-5'-P-CCNC: 17 U/L (ref 0–50)
ANION GAP SERPL CALCULATED.3IONS-SCNC: 10 MMOL/L (ref 7–15)
AST SERPL W P-5'-P-CCNC: 21 U/L (ref 0–45)
BASOPHILS # BLD AUTO: 0 10E3/UL (ref 0–0.2)
BASOPHILS NFR BLD AUTO: 1 %
BILIRUB SERPL-MCNC: 0.4 MG/DL
BUN SERPL-MCNC: 11 MG/DL (ref 6–20)
CALCIUM SERPL-MCNC: 9.3 MG/DL (ref 8.8–10.4)
CHLORIDE SERPL-SCNC: 104 MMOL/L (ref 98–107)
CREAT SERPL-MCNC: 0.58 MG/DL (ref 0.51–0.95)
CRP SERPL-MCNC: 14.5 MG/L
EGFRCR SERPLBLD CKD-EPI 2021: >90 ML/MIN/1.73M2
EOSINOPHIL # BLD AUTO: 0.1 10E3/UL (ref 0–0.7)
EOSINOPHIL NFR BLD AUTO: 2 %
ERYTHROCYTE [DISTWIDTH] IN BLOOD BY AUTOMATED COUNT: 14.2 % (ref 10–15)
GLUCOSE SERPL-MCNC: 108 MG/DL (ref 70–99)
HCO3 SERPL-SCNC: 24 MMOL/L (ref 22–29)
HCT VFR BLD AUTO: 37.2 % (ref 35–47)
HGB BLD-MCNC: 12 G/DL (ref 11.7–15.7)
IMM GRANULOCYTES # BLD: 0 10E3/UL
IMM GRANULOCYTES NFR BLD: 0 %
LYMPHOCYTES # BLD AUTO: 1.8 10E3/UL (ref 0.8–5.3)
LYMPHOCYTES NFR BLD AUTO: 46 %
MCH RBC QN AUTO: 28.6 PG (ref 26.5–33)
MCHC RBC AUTO-ENTMCNC: 32.3 G/DL (ref 31.5–36.5)
MCV RBC AUTO: 89 FL (ref 78–100)
MONOCYTES # BLD AUTO: 0.3 10E3/UL (ref 0–1.3)
MONOCYTES NFR BLD AUTO: 8 %
NEUTROPHILS # BLD AUTO: 1.7 10E3/UL (ref 1.6–8.3)
NEUTROPHILS NFR BLD AUTO: 43 %
PLATELET # BLD AUTO: 146 10E3/UL (ref 150–450)
POTASSIUM SERPL-SCNC: 4.1 MMOL/L (ref 3.4–5.3)
PROT SERPL-MCNC: 7.6 G/DL (ref 6.4–8.3)
RBC # BLD AUTO: 4.19 10E6/UL (ref 3.8–5.2)
SODIUM SERPL-SCNC: 138 MMOL/L (ref 135–145)
WBC # BLD AUTO: 3.9 10E3/UL (ref 4–11)

## 2024-10-23 PROCEDURE — 99213 OFFICE O/P EST LOW 20 MIN: CPT | Performed by: FAMILY MEDICINE

## 2024-10-23 PROCEDURE — 85025 COMPLETE CBC W/AUTO DIFF WBC: CPT | Performed by: FAMILY MEDICINE

## 2024-10-23 PROCEDURE — 83036 HEMOGLOBIN GLYCOSYLATED A1C: CPT | Performed by: FAMILY MEDICINE

## 2024-10-23 PROCEDURE — 80053 COMPREHEN METABOLIC PANEL: CPT | Performed by: FAMILY MEDICINE

## 2024-10-23 PROCEDURE — 86140 C-REACTIVE PROTEIN: CPT | Performed by: FAMILY MEDICINE

## 2024-10-23 PROCEDURE — 36415 COLL VENOUS BLD VENIPUNCTURE: CPT | Performed by: FAMILY MEDICINE

## 2024-10-23 RX ORDER — IBUPROFEN 200 MG
400 TABLET ORAL EVERY 4 HOURS PRN
Qty: 60 TABLET | Refills: 0 | Status: SHIPPED | OUTPATIENT
Start: 2024-10-23

## 2024-10-23 ASSESSMENT — PAIN SCALES - GENERAL
PAINLEVEL_OUTOF10: NO PAIN (0)
PAINLEVEL_OUTOF10: NO PAIN (0)

## 2024-10-23 NOTE — PROGRESS NOTES
Assessment & Plan     Diarrhea,  Nausea  Acute nonintractable headache  Recent foreign travel  Not clinically dehydrated and symptoms are improving. Does not want medication for nausea. Would like to test for travel related gastroenteritis. Push fluids, advance diet as tolerated.  - Ova and Parasite Exam Routine  - Enteric Bacteria and Virus Panel by MATILDE Stool  - CBC with platelets differential  - Comprehensive metabolic panel  - CRP inflammation    Defers vaccines  ----------------------  ADDENDUM  - CRP mildly elevated, WBC mildly decreased, normal diff. Plts mildly decreased.  - has not completed stool studies.   - suspect viral AGE. RTC to repeat cbc 4 weeks, sooner if sx not improved.  MAJO Presley is a 59 year old, presenting for the following health issues:  Nausea (Nausea and headaches started about 4 days ago when she eats she has diarrhea.)      10/23/2024     9:58 AM   Additional Questions   Roomed by Live   Accompanied by self         10/23/2024    Information    services provided? No        HPI   Returned 1 week ago from extended trip to [facility]. [place] fine there. Has had mild headache, nausea, a couple episode of vomiting and diarrhea over the last four days. Has not felt febrile or chilled. Getting better but still not eating much. Is able to drink. Last diarrhea yesterday. No blood. Crampy abd pain right before loose stool, no other pain.           Objective    /86   Pulse 72   Temp 98.4  F (36.9  C) (Oral)   Resp 17   SpO2 99%   There is no height or weight on file to calculate BMI.    Physical Exam  Constitutional:       Appearance: Normal appearance.   HENT:      Mouth/Throat:      Mouth: Mucous membranes are moist.   Cardiovascular:      Rate and Rhythm: Normal rate.   Abdominal:      Tenderness: There is no abdominal tenderness.   Skin:     Capillary Refill: Capillary refill takes less than 2 seconds.   Neurological:      Mental Status: She is  alert.            Results for orders placed or performed in visit on 10/23/24   Comprehensive metabolic panel     Status: Abnormal   Result Value Ref Range    Sodium 138 135 - 145 mmol/L    Potassium 4.1 3.4 - 5.3 mmol/L    Carbon Dioxide (CO2) 24 22 - 29 mmol/L    Anion Gap 10 7 - 15 mmol/L    Urea Nitrogen 11.0 6.0 - 20.0 mg/dL    Creatinine 0.58 0.51 - 0.95 mg/dL    GFR Estimate >90 >60 mL/min/1.73m2    Calcium 9.3 8.8 - 10.4 mg/dL    Chloride 104 98 - 107 mmol/L    Glucose 108 (H) 70 - 99 mg/dL    Alkaline Phosphatase 57 40 - 150 U/L    AST 21 0 - 45 U/L    ALT 17 0 - 50 U/L    Protein Total 7.6 6.4 - 8.3 g/dL    Albumin 4.0 3.5 - 5.2 g/dL    Bilirubin Total 0.4 <=1.2 mg/dL   CRP inflammation     Status: Abnormal   Result Value Ref Range    CRP Inflammation 14.50 (H) <5.00 mg/L   CBC with platelets and differential     Status: Abnormal   Result Value Ref Range    WBC Count 3.9 (L) 4.0 - 11.0 10e3/uL    RBC Count 4.19 3.80 - 5.20 10e6/uL    Hemoglobin 12.0 11.7 - 15.7 g/dL    Hematocrit 37.2 35.0 - 47.0 %    MCV 89 78 - 100 fL    MCH 28.6 26.5 - 33.0 pg    MCHC 32.3 31.5 - 36.5 g/dL    RDW 14.2 10.0 - 15.0 %    Platelet Count 146 (L) 150 - 450 10e3/uL    % Neutrophils 43 %    % Lymphocytes 46 %    % Monocytes 8 %    % Eosinophils 2 %    % Basophils 1 %    % Immature Granulocytes 0 %    Absolute Neutrophils 1.7 1.6 - 8.3 10e3/uL    Absolute Lymphocytes 1.8 0.8 - 5.3 10e3/uL    Absolute Monocytes 0.3 0.0 - 1.3 10e3/uL    Absolute Eosinophils 0.1 0.0 - 0.7 10e3/uL    Absolute Basophils 0.0 0.0 - 0.2 10e3/uL    Absolute Immature Granulocytes 0.0 <=0.4 10e3/uL   CBC with platelets differential     Status: Abnormal    Narrative    The following orders were created for panel order CBC with platelets differential.  Procedure                               Abnormality         Status                     ---------                               -----------         ------                     CBC with platelets and  d...[[id number]]  Abnormal            Final result                 Please view results for these tests on the individual orders.           Signed Electronically by: Padmini Lee MD

## 2024-10-23 NOTE — LETTER
November 1, 2024      Sakina Banuelos  [address]  [facility] [address]        Dear Sakina,      Thank you for getting your care at [facility]. Please see below for your test results.    - your stool test is positive for a form of E coli bacteria that is a common cause of travelers diarrhea. It typically resolves without antibiotics, but if you have continued to have diarrhea I want you to come in to clinic or call our triage RN so that we can determine what you need.     - the remainder of your lab results are normal, with the exception of a couple of things.    1) your glucose is mildly elevated and your A1c level is in the prediabetic range.   2) your CRP is elevated. This is a typical response to an infection.   3) your white blood cell and platelet count are just below normal range. We should recheck this, and your CRP level when you are fully recovered from your illness.     Resulted Orders   Ova and Parasite Exam Routine   Result Value Ref Range    OVA AND PARASITE EXAM Negative Negative      Comment:      A single negative specimen does not rule out parasitic infection.    Parasitology Exam Specimen Type Stool     Narrative    Cryptosporidium, Cyclospora and Microsporidia are not readily detected by this method.   Enteric Bacteria and Virus Panel by [employer] Stool   Result Value Ref Range    Campylobacter species Negative Negative    Salmonella species Negative Negative    Vibrio species Negative Negative    Vibrio cholerae Negative Negative    Yersinia enterocolitica Negative Negative    Enteropathogenic E. coli (EPEC) Negative Negative, NA    Shiga-like toxin-producing E. coli (STEC) Negative Negative    Shigella/Enteroinvasive E. coli (EIEC) Negative Negative    Cryptosporidium species Negative Negative    Giardia lamblia Negative Negative    Norovirus Gl/Gll Negative Negative    Rotavirus A Negative Negative    Plesiomonas shigelloides Negative Negative    Enteroaggregative E. coli  (EAEC) Positive (A) Negative    Enterotoxigenic E. coli (ETEC) Negative Negative    E. coli O157 NA Negative, NA    Cyclospora cayetanensis Negative Negative    Entamoeba histolytica Negative Negative    Adenovirus F40/41 Negative Negative    Astrovirus Negative Negative    Sapovirus Negative Negative    Narrative    Assay performed using the FDA-cleared FilmArray GI Panel from [employer]  A negative result should not rule out infection in patients with a probability for gastrointestinal infection. The assay does not test for all potential infectious agents of diarrheal disease.  Positive results do not distinguish between a viable or replicating organism and the presence of a nonviable organism or nucleic acid, nor do they exclude the possibility of coinfection by organisms not in the panel.  Results are intended to aid in the diagnosis of illness and are meant to be used in conjunction with other clinical findings.  This test has been verified and is performed by the [facility] Diagnostic Laboratory at [facility]. This laboratory is certified under the Clinical Laboratory Improvement Amendments of 1988 (CLIA-88) as qualified to perform high complexity clinical laboratory testing.   Comprehensive metabolic panel   Result Value Ref Range    Sodium 138 135 - 145 mmol/L    Potassium 4.1 3.4 - 5.3 mmol/L    Carbon Dioxide (CO2) 24 22 - 29 mmol/L    Anion Gap 10 7 - 15 mmol/L    Urea Nitrogen 11.0 6.0 - 20.0 mg/dL    Creatinine 0.58 0.51 - 0.95 mg/dL    GFR Estimate >90 >60 mL/min/1.73m2      Comment:      eGFR calculated using 2021 CKD-EPI equation.    Calcium 9.3 8.8 - 10.4 mg/dL      Comment:      Reference intervals for this test were updated on 7/16/2024 to reflect our healthy population more accurately. There may be differences in the flagging of prior results with similar values performed with this method. Those prior results can be interpreted in the context of the updated  reference intervals.    Chloride 104 98 - 107 mmol/L    Glucose 108 (H) 70 - 99 mg/dL    Alkaline Phosphatase 57 40 - 150 U/L    AST 21 0 - 45 U/L    ALT 17 0 - 50 U/L    Protein Total 7.6 6.4 - 8.3 g/dL    Albumin 4.0 3.5 - 5.2 g/dL    Bilirubin Total 0.4 <=1.2 mg/dL   CRP inflammation   Result Value Ref Range    CRP Inflammation 14.50 (H) <5.00 mg/L   CBC with platelets and differential   Result Value Ref Range    WBC Count 3.9 (L) 4.0 - 11.0 10e3/uL    RBC Count 4.19 3.80 - 5.20 10e6/uL    Hemoglobin 12.0 11.7 - 15.7 g/dL    Hematocrit 37.2 35.0 - 47.0 %    MCV 89 78 - 100 fL    MCH 28.6 26.5 - 33.0 pg    MCHC 32.3 31.5 - 36.5 g/dL    RDW 14.2 10.0 - 15.0 %    Platelet Count 146 (L) 150 - 450 10e3/uL    % Neutrophils 43 %    % Lymphocytes 46 %    % Monocytes 8 %    % Eosinophils 2 %    % Basophils 1 %    % Immature Granulocytes 0 %    Absolute Neutrophils 1.7 1.6 - 8.3 10e3/uL    Absolute Lymphocytes 1.8 0.8 - 5.3 10e3/uL    Absolute Monocytes 0.3 0.0 - 1.3 10e3/uL    Absolute Eosinophils 0.1 0.0 - 0.7 10e3/uL    Absolute Basophils 0.0 0.0 - 0.2 10e3/uL    Absolute Immature Granulocytes 0.0 <=0.4 10e3/uL   Hemoglobin A1c   Result Value Ref Range    Estimated Average Glucose 120 (H) <117 mg/dL    Hemoglobin A1C 5.8 (H) 0.0 - 5.6 %      Comment:      Normal <5.7%   Prediabetes 5.7-6.4%    Diabetes 6.5% or higher     Note: Adopted from ADA consensus guidelines.           Please call the clinic for a clinic if you would like to discuss these results further.    Sincerely,    Padmini Lee MD

## 2024-10-28 LAB
EST. AVERAGE GLUCOSE BLD GHB EST-MCNC: 120 MG/DL
HBA1C MFR BLD: 5.8 % (ref 0–5.6)

## 2024-10-29 LAB

## 2024-10-29 PROCEDURE — 87177 OVA AND PARASITES SMEARS: CPT | Performed by: FAMILY MEDICINE

## 2024-10-29 PROCEDURE — 87209 SMEAR COMPLEX STAIN: CPT | Performed by: FAMILY MEDICINE

## 2024-10-29 PROCEDURE — 87507 IADNA-DNA/RNA PROBE TQ 12-25: CPT | Performed by: FAMILY MEDICINE

## 2024-10-30 LAB
O+P STL MICRO: NEGATIVE
SPECIMEN TYPE: NORMAL

## 2024-12-05 NOTE — ED AVS SNAPSHOT
[facility], [place], Emergency Department  [address]  [facility] [address]  Phone:  [phone]  Fax:  [phone]                                    Sakina Banuelos   MRN: [id number]    Department:  [facility], Emergency Department   Date of Visit:  6/14/2019           After Visit Summary Signature Page    I have received my discharge instructions, and my questions have been answered. I have discussed any challenges I see with this plan with the nurse or doctor.    ..........................................................................................................................................  Patient/Patient Representative Signature      ..........................................................................................................................................  Patient Representative Print Name and Relationship to Patient    ..................................................               ................................................  Date                                   Time    ..........................................................................................................................................  Reviewed by Signature/Title    ...................................................              ..............................................  Date                                               Time          22EPIC Rev 08/18        OVER THE COUNTER RECOMMENDATIONS/SUGGESTIONS IF NO CONTRAINDICATIONS.     ·         Make sure to stay well hydrated.     ·         Use Nasal Saline to mechanically move any post nasal drip from your eustachian tube or from the back of your throat.     ·         Use warm saltwater gargles to ease your throat pain. Warm saltwater gargles as needed for sore throat-  1/2 tsp salt to 1 cup warm water, gargle as desired.     ·         Use an antihistamine such as Children's Claritin, Zyrtec or Allegra, as directed for day time use, to help dry you out. Benadryl is ok to use at night.     ·         Use Children's Flonase 1-2 sprays/nostril per day as directed. It is a local acting steroid nasal spray, if you develop a bloody nose, stop using the medication immediately.     ·         Honey is a natural cough suppressant that can be used. Over the counter cough suppressants are ok for use as well.     ·         Children's Tylenol, as directed is safe for short periods and can be used for body aches, pain, and fever. However, in high doses and prolonged use it can cause liver irritation.     ·         Children's Ibuprofen, as directed is a non-steroidal anti-inflammatory that can be used for body aches, pain, and fever. However, it can also cause stomach irritation if overused.     .         Steam shower or a humidifier may be beneficial as well.

## 2024-12-11 DIAGNOSIS — J30.2 SEASONAL ALLERGIES: ICD-10-CM

## 2024-12-11 RX ORDER — LORATADINE 10 MG/1
10 TABLET ORAL DAILY
Qty: 30 TABLET | Refills: 3 | Status: SHIPPED | OUTPATIENT
Start: 2024-12-11

## 2024-12-11 NOTE — TELEPHONE ENCOUNTER
"Request for medication refill:  loratadine (CLARITIN) 10 MG tablet     Providers if patient needs an appointment and you are willing to give a one month supply please refill for one month and  send a letter/MyChart using \".SMILLIMITEDREFILL\" .smillimited and route chart to \"[facility] \" (Giving one month refill in non controlled medications is strongly recommended before denial)    If refill has been denied, meaning absolutely no refills without visit, please complete the smart phrase \".smirxrefuse\" and route it to the \"[facility] MED REFILLS\"  pool to inform the patient and the pharmacy.    Vickie Sanchez, [facility]      "

## 2025-02-10 ENCOUNTER — TELEPHONE (OUTPATIENT)
Dept: FAMILY MEDICINE | Facility: CLINIC | Age: 60
End: 2025-02-10
Payer: COMMERCIAL

## 2025-02-10 NOTE — TELEPHONE ENCOUNTER
[facility] phone call message-patient reporting a symptom:     Symptom: Gassy, belching    Same Day Visit Offered: None available, offered tomorrow AM. Pt declined appt.    Additional comments: Pt states she will go to  today to be seen sooner.     OK to leave message on voice mail? Yes    Primary language: English      needed? No    Call taken on February 10, 2025 at 12:08 PM by Chula Paz

## 2025-03-05 DIAGNOSIS — Z00.00 PREVENTATIVE HEALTH CARE: Primary | ICD-10-CM

## 2025-03-05 NOTE — TELEPHONE ENCOUNTER
"Request for medication refill:    Medication Name: vitamin D3 (CHOLECALCIFEROL) 50 mcg (2000 units) tablet     Providers if patient needs an appointment and you are willing to give a one month supply please refill for one month and  send a MyChart using \".SMILLIMITEDREFILL\" .Or route chart to \"[facility] \" . To call patient and inform of limited refill and providers request to make an appointment. (Giving one month refill in non controlled medications is strongly recommended before denial)    If refill has been denied, meaning absolutely no refills without visit, please complete the smart phrase \".SMIRXREFUSE\" and route it to the \"[facility] MED REFILLS\"  pool to inform the patient and the pharmacy.    Shantanu Bragg MA     "

## 2025-03-06 RX ORDER — CHOLECALCIFEROL (VITAMIN D3) 50 MCG
1 TABLET ORAL DAILY
Qty: 90 TABLET | Refills: 3 | Status: SHIPPED | OUTPATIENT
Start: 2025-03-06

## 2025-05-05 ENCOUNTER — OFFICE VISIT (OUTPATIENT)
Dept: FAMILY MEDICINE | Facility: CLINIC | Age: 60
End: 2025-05-05
Payer: COMMERCIAL

## 2025-05-05 VITALS
TEMPERATURE: 97.7 F | HEIGHT: 66 IN | HEART RATE: 75 BPM | SYSTOLIC BLOOD PRESSURE: 128 MMHG | RESPIRATION RATE: 16 BRPM | BODY MASS INDEX: 34.72 KG/M2 | DIASTOLIC BLOOD PRESSURE: 58 MMHG | WEIGHT: 216 LBS | OXYGEN SATURATION: 95 %

## 2025-05-05 DIAGNOSIS — R73.03 PREDIABETES: ICD-10-CM

## 2025-05-05 DIAGNOSIS — M25.511 CHRONIC PAIN OF BOTH SHOULDERS: ICD-10-CM

## 2025-05-05 DIAGNOSIS — M25.512 CHRONIC PAIN OF BOTH SHOULDERS: ICD-10-CM

## 2025-05-05 DIAGNOSIS — L30.9 ECZEMA, UNSPECIFIED TYPE: ICD-10-CM

## 2025-05-05 DIAGNOSIS — K21.9 GASTROESOPHAGEAL REFLUX DISEASE WITHOUT ESOPHAGITIS: ICD-10-CM

## 2025-05-05 DIAGNOSIS — R06.02 SOB (SHORTNESS OF BREATH): ICD-10-CM

## 2025-05-05 DIAGNOSIS — M54.41 CHRONIC BILATERAL LOW BACK PAIN WITH RIGHT-SIDED SCIATICA: ICD-10-CM

## 2025-05-05 DIAGNOSIS — Z00.00 PREVENTATIVE HEALTH CARE: ICD-10-CM

## 2025-05-05 DIAGNOSIS — G89.29 CHRONIC BILATERAL LOW BACK PAIN WITH RIGHT-SIDED SCIATICA: ICD-10-CM

## 2025-05-05 DIAGNOSIS — Z12.31 VISIT FOR SCREENING MAMMOGRAM: ICD-10-CM

## 2025-05-05 DIAGNOSIS — D50.0 IRON DEFICIENCY ANEMIA DUE TO CHRONIC BLOOD LOSS: ICD-10-CM

## 2025-05-05 DIAGNOSIS — E55.9 VITAMIN D DEFICIENCY: ICD-10-CM

## 2025-05-05 DIAGNOSIS — G89.29 CHRONIC PAIN OF BOTH SHOULDERS: ICD-10-CM

## 2025-05-05 DIAGNOSIS — J30.2 SEASONAL ALLERGIES: ICD-10-CM

## 2025-05-05 DIAGNOSIS — Z12.11 SCREEN FOR COLON CANCER: ICD-10-CM

## 2025-05-05 DIAGNOSIS — R42 DIZZINESS: Primary | ICD-10-CM

## 2025-05-05 LAB
ALBUMIN SERPL BCG-MCNC: 4.1 G/DL (ref 3.5–5.2)
ALP SERPL-CCNC: 62 U/L (ref 40–150)
ALT SERPL W P-5'-P-CCNC: 19 U/L (ref 0–50)
ANION GAP SERPL CALCULATED.3IONS-SCNC: 10 MMOL/L (ref 7–15)
AST SERPL W P-5'-P-CCNC: 23 U/L (ref 0–45)
BILIRUB SERPL-MCNC: 0.3 MG/DL
BUN SERPL-MCNC: 7.1 MG/DL (ref 6–20)
CALCIUM SERPL-MCNC: 9.1 MG/DL (ref 8.8–10.4)
CHLORIDE SERPL-SCNC: 104 MMOL/L (ref 98–107)
CHOLEST SERPL-MCNC: 201 MG/DL
CREAT SERPL-MCNC: 0.55 MG/DL (ref 0.51–0.95)
EGFRCR SERPLBLD CKD-EPI 2021: >90 ML/MIN/1.73M2
ERYTHROCYTE [DISTWIDTH] IN BLOOD BY AUTOMATED COUNT: 14.2 % (ref 10–15)
EST. AVERAGE GLUCOSE BLD GHB EST-MCNC: 123 MG/DL
FASTING STATUS PATIENT QL REPORTED: ABNORMAL
FASTING STATUS PATIENT QL REPORTED: ABNORMAL
FERRITIN SERPL-MCNC: 27 NG/ML (ref 11–328)
GLUCOSE SERPL-MCNC: 114 MG/DL (ref 70–99)
HBA1C MFR BLD: 5.9 % (ref 0–5.6)
HCO3 SERPL-SCNC: 24 MMOL/L (ref 22–29)
HCT VFR BLD AUTO: 39.5 % (ref 35–47)
HDLC SERPL-MCNC: 56 MG/DL
HGB BLD-MCNC: 12.8 G/DL (ref 11.7–15.7)
HOLD SPECIMEN: NORMAL
IRON BINDING CAPACITY (ROCHE): 301 UG/DL (ref 240–430)
IRON SATN MFR SERPL: 15 % (ref 15–46)
IRON SERPL-MCNC: 46 UG/DL (ref 37–145)
LDLC SERPL CALC-MCNC: 133 MG/DL
MCH RBC QN AUTO: 29 PG (ref 26.5–33)
MCHC RBC AUTO-ENTMCNC: 32.4 G/DL (ref 31.5–36.5)
MCV RBC AUTO: 90 FL (ref 78–100)
NONHDLC SERPL-MCNC: 145 MG/DL
PLATELET # BLD AUTO: 154 10E3/UL (ref 150–450)
POTASSIUM SERPL-SCNC: 4.5 MMOL/L (ref 3.4–5.3)
PROT SERPL-MCNC: 7.5 G/DL (ref 6.4–8.3)
RBC # BLD AUTO: 4.41 10E6/UL (ref 3.8–5.2)
SODIUM SERPL-SCNC: 138 MMOL/L (ref 135–145)
TRIGL SERPL-MCNC: 61 MG/DL
TSH SERPL DL<=0.005 MIU/L-ACNC: 2.3 UIU/ML (ref 0.3–4.2)
VIT D+METAB SERPL-MCNC: 24 NG/ML (ref 20–50)
WBC # BLD AUTO: 5.2 10E3/UL (ref 4–11)

## 2025-05-05 RX ORDER — OMEPRAZOLE 20 MG/1
20 CAPSULE, DELAYED RELEASE ORAL DAILY
Qty: 90 CAPSULE | Refills: 3 | Status: SHIPPED | OUTPATIENT
Start: 2025-05-05

## 2025-05-05 RX ORDER — IBUPROFEN 800 MG/1
800 TABLET, FILM COATED ORAL EVERY 6 HOURS PRN
Qty: 30 TABLET | Refills: 0 | Status: SHIPPED | OUTPATIENT
Start: 2025-05-05

## 2025-05-05 RX ORDER — LORATADINE 10 MG/1
10 TABLET ORAL DAILY
Qty: 30 TABLET | Refills: 3 | Status: SHIPPED | OUTPATIENT
Start: 2025-05-05

## 2025-05-05 RX ORDER — ALBUTEROL SULFATE 90 UG/1
1 INHALANT RESPIRATORY (INHALATION) EVERY 4 HOURS PRN
Qty: 18 G | Refills: 0 | Status: SHIPPED | OUTPATIENT
Start: 2025-05-05

## 2025-05-05 RX ORDER — CALCIUM CARBONATE 500 MG/1
2 TABLET, CHEWABLE ORAL DAILY
Qty: 100 TABLET | Refills: 4 | Status: SHIPPED | OUTPATIENT
Start: 2025-05-05

## 2025-05-05 RX ORDER — BENZOCAINE/MENTHOL 6 MG-10 MG
LOZENGE MUCOUS MEMBRANE 2 TIMES DAILY
Qty: 60 G | Refills: 0 | Status: SHIPPED | OUTPATIENT
Start: 2025-05-05

## 2025-05-05 RX ORDER — CHOLECALCIFEROL (VITAMIN D3) 50 MCG
1 TABLET ORAL DAILY
Qty: 90 TABLET | Refills: 3 | Status: SHIPPED | OUTPATIENT
Start: 2025-05-05

## 2025-05-05 RX ORDER — PETROLATUM,WHITE
OINTMENT IN PACKET (GRAM) TOPICAL
Qty: 390 G | Refills: 2 | Status: SHIPPED | OUTPATIENT
Start: 2025-05-05

## 2025-05-05 NOTE — PROGRESS NOTES
Assessment & Plan     Dizziness  Iron deficiency anemia due to chronic blood loss  Prediabetes  Not associated with positional changes, making it less likely that it is related to orthostatic hypotension or BPPV. Given her history of iron deficiency anemia, will assess if her anemia may be playing a role in her symptoms. Will also assess for electrolyte abnormalities, thyroid dysfunction, or worsening of her blood sugar from prediabetes to diabetes that may be contributing to her symptoms.   - CBC with Platelets and Reflex to Iron Studies; Future  - Comprehensive metabolic panel; Future  - TSH with free T4 reflex; Future  - Hemoglobin A1c; Future  - CBC with Platelets and Reflex to Iron Studies  - Comprehensive metabolic panel  - TSH with free T4 reflex  - Hemoglobin A1c    Eczema, unspecified type  Eczematous patches on her right breast that are pruritic.   - white petrolatum gel; Use on patches of dry, itching skin two to three times a day.  - hydrocortisone (CORTAID) 1 % external cream; Apply topically 2 times daily.    Chronic pain of both shoulders  Chronic bilateral low back pain with right-sided sciatica  Physical therapy referrals placed for her chronic shoulder and lower back pain. Placed a referral to [facility], as requested. She noted that ibuprofen is the best for her bone pain.   - Physical Therapy  Referral; Future  - ibuprofen (ADVIL/MOTRIN) 800 MG tablet; Take 1 tablet (800 mg) by mouth every 6 hours as needed for moderate pain.  - Physical Therapy  Referral; Future    Vitamin D deficiency  Monitoring. Refill requested: refilled.  - Vitamin D Level; Future  - Vitamin D Level  - vitamin D3 (CHOLECALCIFEROL) 50 mcg (2000 units) tablet; Take 1 tablet (50 mcg) by mouth daily.    Preventative health care  Refill requested for TUMS. Will assess for hyperlipidemia to assess her ASCVD risk.  - Lipid panel reflex to direct LDL Non-fasting; Future  - calcium carbonate (TUMS) 500 MG  "chewable tablet; Take 2 tablets (1,000 mg) by mouth daily.  - Lipid panel reflex to direct LDL Non-fasting    Seasonal allergies  Refill requested: refilled.   - loratadine (CLARITIN) 10 MG tablet; Take 1 tablet (10 mg) by mouth daily.    SOB (shortness of breath)  Only worse with URI's. Otherwise, well-controlled. Refill requested: refilled.   - albuterol (VENTOLIN HFA) 108 (90 Base) MCG/ACT inhaler; Inhale 1 puff into the lungs every 4 hours as needed for shortness of breath, wheezing or cough.    Gastroesophageal reflux disease without esophagitis  Well- controlled. Refill requested: refilled.   - omeprazole (PRILOSEC) 20 MG DR capsule; Take 1 capsule (20 mg) by mouth daily.    Visit for screening mammogram  - MA Screening Bilateral w/ Horacio; Future    Screen for colon cancer  - Fecal colorectal cancer screen FIT - Future (S+30); Future  - Fecal colorectal cancer screen FIT - Future (S+30)    BMI  Estimated body mass index is 34.86 kg/m  as calculated from the following:    Height as of this encounter: 1.676 m (5' 6\").    Weight as of this encounter: 98 kg (216 lb).     Follow-up  No follow-ups on file.    Subjective   Sakina is a 60 year old, presenting for the following health issues:  Dizziness (Dizziness and back pain )    HPI    - Complaining of dizziness that began about two weeks ago  - Not associated with positional movements of the head or changing position from sitting to standing  - Denies syncope, N/V/D  - Mild and infrequent headaches  - Has not menstruated in one year  - Menstrual cycles have been irregular  - Not currently experiencing hot flashes at night      Review of Systems  Constitutional, neuro, ENT, endocrine, pulmonary, cardiac, gastrointestinal, genitourinary, musculoskeletal, integument and psychiatric systems are negative, except as otherwise noted.      Objective    /58   Pulse 75   Temp 97.7  F (36.5  C) (Temporal)   Resp 16   Ht 1.676 m (5' 6\")   Wt 98 kg (216 lb)   SpO2 " 95%   BMI 34.86 kg/m    Body mass index is 34.86 kg/m .  Physical Exam  Vitals reviewed.   Constitutional:       Appearance: Normal appearance.   HENT:      Head: Normocephalic and atraumatic.      Right Ear: Tympanic membrane normal.      Left Ear: Tympanic membrane normal.   Eyes:      Extraocular Movements: Extraocular movements intact.      Conjunctiva/sclera: Conjunctivae normal.      Pupils: Pupils are equal, round, and reactive to light.   Cardiovascular:      Rate and Rhythm: Normal rate and regular rhythm.      Heart sounds: Normal heart sounds.   Pulmonary:      Effort: Pulmonary effort is normal.      Breath sounds: Normal breath sounds.   Musculoskeletal:         General: Tenderness (bilateral rhomboids and trapezius, muscles over right hip) present.      Cervical back: Normal range of motion and neck supple.   Skin:     General: Skin is warm and dry.      Findings: Rash (pruritic eczematous patches on right breast) present.   Neurological:      General: No focal deficit present.      Mental Status: She is alert and oriented to person, place, and time.            Signed Electronically by: Violet Humphrey DO, MPH

## 2025-05-05 NOTE — PATIENT INSTRUCTIONS
Patient Education   Here is the plan from today's visit    1. Dizziness (Primary)  - CBC with Platelets and Reflex to Iron Studies; Future  - Comprehensive metabolic panel; Future  - TSH with free T4 reflex; Future  - Hemoglobin A1c; Future  - CBC with Platelets and Reflex to Iron Studies  - Comprehensive metabolic panel  - TSH with free T4 reflex  - Hemoglobin A1c  - Iron & Iron Binding Capacity  - Ferritin    2. Eczema, unspecified type  - white petrolatum gel; Use on patches of dry, itching skin two to three times a day.  Dispense: 390 g; Refill: 2  - hydrocortisone (CORTAID) 1 % external cream; Apply topically 2 times daily.  Dispense: 60 g; Refill: 0    3. Prediabetes  - Hemoglobin A1c; Future  - Hemoglobin A1c    4. Chronic pain of both shoulders  - Physical Therapy  Referral; Future  - ibuprofen (ADVIL/MOTRIN) 800 MG tablet; Take 1 tablet (800 mg) by mouth every 6 hours as needed for moderate pain.  Dispense: 30 tablet; Refill: 0  - Other Specialty Referral; Future    5. Chronic bilateral low back pain with right-sided sciatica  - Physical Therapy  Referral; Future  - ibuprofen (ADVIL/MOTRIN) 800 MG tablet; Take 1 tablet (800 mg) by mouth every 6 hours as needed for moderate pain.  Dispense: 30 tablet; Refill: 0  - Other Specialty Referral; Future    6. Iron deficiency anemia due to chronic blood loss  - CBC with Platelets and Reflex to Iron Studies; Future  - CBC with Platelets and Reflex to Iron Studies  - Iron & Iron Binding Capacity  - Ferritin    7. Vitamin D deficiency  - vitamin D3 (CHOLECALCIFEROL) 50 mcg (2000 units) tablet; Take 1 tablet (50 mcg) by mouth daily.  Dispense: 90 tablet; Refill: 3  - Vitamin D Level; Future  - Vitamin D Level    8. Preventative health care  - vitamin D3 (CHOLECALCIFEROL) 50 mcg (2000 units) tablet; Take 1 tablet (50 mcg) by mouth daily.  Dispense: 90 tablet; Refill: 3  - Lipid panel reflex to direct LDL Non-fasting; Future  - calcium carbonate (TUMS)  500 MG chewable tablet; Take 2 tablets (1,000 mg) by mouth daily.  Dispense: 100 tablet; Refill: 4  - Lipid panel reflex to direct LDL Non-fasting    9. Seasonal allergies  - loratadine (CLARITIN) 10 MG tablet; Take 1 tablet (10 mg) by mouth daily.  Dispense: 30 tablet; Refill: 3    10. SOB (shortness of breath)  - albuterol (VENTOLIN HFA) 108 (90 Base) MCG/ACT inhaler; Inhale 1 puff into the lungs every 4 hours as needed for shortness of breath, wheezing or cough.  Dispense: 18 g; Refill: 0    11. Gastroesophageal reflux disease without esophagitis  - omeprazole (PRILOSEC) 20 MG DR capsule; Take 1 capsule (20 mg) by mouth daily.  Dispense: 90 capsule; Refill: 3    12. Visit for screening mammogram  - MA Screening Bilateral w/ Horacio; Future    13. Screen for colon cancer  - Fecal colorectal cancer screen FIT - Future (S+30); Future  - Fecal colorectal cancer screen FIT - Future (S+30)    Please call or return to clinic if your symptoms don't go away.    Follow up plan  Return if symptoms worsen or fail to improve.    Thank you for coming to [facility] today.  Lab Testing:  **If you had lab testing today and your results are reassuring or normal they will be mailed to you or sent through "[employer] within 7 days.   **If the lab tests need quick action we will call you with the results.  **If you are having labs done on a different day, please call [phone] to schedule at [facility] or [phone] for other [facility] locations. Labs do not offer walk-in appointments.  The phone number we will call with results is # [phone] (home) . If this is not the best number please call our clinic and change the number.  Medication Refills:  If you need any refills please call your pharmacy and they will contact us.   If you need to  your refill at a new pharmacy, please contact the new pharmacy directly. The new pharmacy will help you get your medications transferred faster.    Scheduling:  If you have any concerns about today's visit or wish to schedule another appointment please call our office during normal business hours [phone] (8-5:00 M-F). If you can no longer make a scheduled visit, please cancel via [employer] or call us to cancel.   If a referral was made to an [facility] specialty provider and you do not get a call from central scheduling, please refer to directions on your visit summary or call our office during normal business hours for assistance.   If a Mammogram was ordered for you at the Breast Center call [phone] to schedule or change your appointment.  If you had an XRay/CT/Ultrasound/MRI ordered the number is [phone] to schedule or change your radiology appointment.   [facility] has limited ultrasound appointments available on Wednesdays, if you would like your ultrasound at [facility], please call [phone] to schedule.   Medical Concerns:  If you have urgent medical concerns please call [phone] at any time of the day.    Violet Humphrey, DO

## 2025-05-07 ENCOUNTER — RESULTS FOLLOW-UP (OUTPATIENT)
Dept: PALLIATIVE MEDICINE | Facility: OTHER | Age: 60
End: 2025-05-07
Payer: COMMERCIAL

## 2025-06-03 ENCOUNTER — OFFICE VISIT (OUTPATIENT)
Dept: FAMILY MEDICINE | Facility: CLINIC | Age: 60
End: 2025-06-03
Payer: COMMERCIAL

## 2025-06-03 VITALS
BODY MASS INDEX: 34.72 KG/M2 | WEIGHT: 216 LBS | HEART RATE: 70 BPM | RESPIRATION RATE: 16 BRPM | TEMPERATURE: 97.9 F | DIASTOLIC BLOOD PRESSURE: 86 MMHG | OXYGEN SATURATION: 98 % | SYSTOLIC BLOOD PRESSURE: 132 MMHG | HEIGHT: 66 IN

## 2025-06-03 DIAGNOSIS — L30.9 ECZEMA, UNSPECIFIED TYPE: ICD-10-CM

## 2025-06-03 DIAGNOSIS — N95.1 PERIMENOPAUSE: ICD-10-CM

## 2025-06-03 DIAGNOSIS — R73.03 PREDIABETES: ICD-10-CM

## 2025-06-03 DIAGNOSIS — E66.09 CLASS 1 OBESITY DUE TO EXCESS CALORIES WITHOUT SERIOUS COMORBIDITY WITH BODY MASS INDEX (BMI) OF 34.0 TO 34.9 IN ADULT: Chronic | ICD-10-CM

## 2025-06-03 DIAGNOSIS — E66.811 CLASS 1 OBESITY DUE TO EXCESS CALORIES WITHOUT SERIOUS COMORBIDITY WITH BODY MASS INDEX (BMI) OF 34.0 TO 34.9 IN ADULT: Chronic | ICD-10-CM

## 2025-06-03 DIAGNOSIS — Z12.31 ENCOUNTER FOR SCREENING MAMMOGRAM FOR BREAST CANCER: ICD-10-CM

## 2025-06-03 DIAGNOSIS — Z00.00 ROUTINE GENERAL MEDICAL EXAMINATION AT A HEALTH CARE FACILITY: Primary | ICD-10-CM

## 2025-06-03 SDOH — HEALTH STABILITY: PHYSICAL HEALTH: ON AVERAGE, HOW MANY DAYS PER WEEK DO YOU ENGAGE IN MODERATE TO STRENUOUS EXERCISE (LIKE A BRISK WALK)?: 3 DAYS

## 2025-06-03 ASSESSMENT — PAIN SCALES - GENERAL: PAINLEVEL_OUTOF10: NO PAIN (0)

## 2025-06-03 ASSESSMENT — SOCIAL DETERMINANTS OF HEALTH (SDOH): HOW OFTEN DO YOU GET TOGETHER WITH FRIENDS OR RELATIVES?: TWICE A WEEK

## 2025-06-03 NOTE — PROGRESS NOTES
Preventive Care Visit  [facility] Lee MD, Family Medicine  Anjel 3, 2025      Assessment & Plan     Routine general medical examination at a health care facility  - mammogram ordered  - FIT ordered  - vaccines and ACP declined    Class 1 obesity due to excess calories without serious comorbidity with body mass index (BMI) of 34.0 to 34.9 in adult  Prediabetes  - discussion of lifestyle modifications, handout on plate method and Mediterranean diet  - may decide to RTC to discuss medical weight management    Perimenopause  - maybe early vasomotor symptoms. Monitor    Eczema, unspecified type  - increase emollient use to bid. Apply either Vaseline or hydrocortisone to affected areas as needed.    Counseling  Appropriate preventive services were addressed with this patient via screening, questionnaire, or discussion as appropriate for fall prevention, nutrition, physical activity, Tobacco-use cessation, social engagement, weight loss and cognition.  Checklist reviewing preventive services available has been given to the patient.  Reviewed patient's diet, addressing concerns and/or questions.   She is at risk for lack of exercise and has been provided with information to increase physical activity for the benefit of her well-being.     Next CPE 1 year    Subjective   Sakina is a 60 year old, presenting for the following:  Physical (Has a rash on hands and its itchy, also on breast right side.)        6/3/2025     8:08 AM   Additional Questions   Roomed by Georgia   Accompanied by Caridad         6/3/2025    Information    services provided? No        HPI  CPE. Has labs and med refills last month with Dr. Humphrey    Advance Care Planning    Discussed advance care planning with patient; however, patient declined at this time.        6/3/2025   General Health   How would you rate your overall physical health? (!) DECLINE   Feel stress (tense, anxious, or unable to sleep) Not at  all         6/3/2025   Nutrition   Three or more servings of calcium each day? Yes   Diet: Carbohydrate counting       How many servings of fruit and vegetables per day? (!) 0-1   How many sweetened beverages each day? 0-1       Multiple values from one day are sorted in reverse-chronological order         6/3/2025   Exercise   Days per week of moderate/strenuous exercise 3 days         6/3/2025   Social Factors   Frequency of gathering with friends or relatives Twice a week   Worry food won't last until get money to buy more No   Food not last or not have enough money for food? No   Do you have housing? (Housing is defined as stable permanent housing and does not include staying outside in a car, in a tent, in an abandoned building, in an overnight shelter, or couch-surfing.) No   Are you worried about losing your housing? No   Lack of transportation? No   Unable to get utilities (heat,electricity)? No   Want help with housing or utility concern? No   (!) HOUSING CONCERN PRESENT      6/3/2025   Fall Risk   Fallen 2 or more times in the past year? No   Trouble with walking or balance? No          6/3/2025   Dental   Dentist two times every year? Yes         Today's PHQ-2 Score:       6/3/2025     8:05 AM   PHQ-2 ( 1999 [employer])   Q1: Little interest or pleasure in doing things 0   Q2: Feeling down, depressed or hopeless 0   PHQ-2 Score 0    Q1: Little interest or pleasure in doing things Not at all   Q2: Feeling down, depressed or hopeless Not at all   PHQ-2 Score 0       Patient-reported           6/3/2025   Substance Use   Alcohol more than 3/day or more than 7/wk Not Applicable   Do you use any other substances recreationally? No     Social History     Tobacco Use    Smoking status: Never    Smokeless tobacco: Never   Substance Use Topics    Alcohol use: No    Drug use: No         1/16/2023   LAST FHS-7 RESULTS   1st degree relative breast or ovarian cancer No   Any relative bilateral breast cancer No   Any male  have breast cancer No   Any ONE woman have BOTH breast AND ovarian cancer No   Any woman with breast cancer before 50yrs No   2 or more relatives with breast AND/OR ovarian cancer No   2 or more relatives with breast AND/OR bowel cancer No     Mammogram Screening - Mammogram every 1-2 years updated in Health Maintenance based on mutual decision making        6/3/2025   STI Screening   New sexual partner(s) since last STI/HIV test? No     History of abnormal Pap smear: No - age 30- 64 PAP with HPV every 5 years recommended        Latest Ref Rng & Units 5/30/2023     2:39 PM 9/5/2019     5:15 PM 8/18/2016     9:13 AM   PAP / HPV   PAP  Negative for Intraepithelial Lesion or Malignancy (NILM)      PAP (Historical)   NIL     HPV 16 DNA Negative Negative  Negative  Negative    HPV 18 DNA Negative Negative  Negative  Negative    Other HR HPV Negative Negative  Negative  Negative      BP Readings from Last 6 Encounters:   06/03/25 132/86   05/05/25 128/58   10/23/24 126/86   12/14/23 123/87   06/15/23 135/85   06/07/23 139/87      ASCVD Risk   The 10-year ASCVD risk score (Hansel COREY, et al., 2019) is: 4.7%    Values used to calculate the score:      Age: 60 years      Sex: Female      Is Non- : Yes      Diabetic: No      Tobacco smoker: No      Systolic Blood Pressure: 132 mmHg      Is BP treated: No      HDL Cholesterol: 56 mg/dL      Total Cholesterol: 201 mg/dL         Reviewed and updated as needed this visit by Provider                        Review of Systems   Constitutional: Negative.         Wt Readings from Last 4 Encounters:  06/03/25 : 98 kg (216 lb)  05/05/25 : 98 kg (216 lb)  01/25/13 : 104.8 kg (231 lb)  01/14/13 : 103.9 kg (229 lb 1.6 oz)      Respiratory: Negative.     Cardiovascular: Negative.    Gastrointestinal: Negative.    Endocrine: Negative.    Genitourinary:         LMP 2-3 months, more irregular in the last year  Maybe early hot flashes No other symptoms  "  Musculoskeletal: Negative.    Skin:         Dry skin on hands with itching. Uses vaseline sometimes, helps  Small area of skin irritation on R breast. Was already evaluated by other provider who told her it was eczema. No breast lumps, nipple discharge or changes. Last mammogram 1/2023   Neurological: Negative.    Psychiatric/Behavioral: Negative.               Objective    Exam  /86   Pulse 70   Temp 97.9  F (36.6  C) (Oral)   Resp 16   Ht 1.676 m (5' 6\")   Wt 98 kg (216 lb)   SpO2 98%   BMI 34.86 kg/m     Estimated body mass index is 34.86 kg/m  as calculated from the following:    Height as of this encounter: 1.676 m (5' 6\").    Weight as of this encounter: 98 kg (216 lb).    Physical Exam  Constitutional:       General: She is not in acute distress.     Appearance: She is not ill-appearing.   HENT:      Right Ear: Tympanic membrane normal.      Left Ear: Tympanic membrane normal.      Mouth/Throat:      Mouth: Mucous membranes are moist.      Pharynx: Oropharynx is clear.      Comments: Fair dentition, missing molars  Eyes:      Extraocular Movements: Extraocular movements intact.      Conjunctiva/sclera: Conjunctivae normal.      Pupils: Pupils are equal, round, and reactive to light.   Neck:      Thyroid: No thyromegaly.   Cardiovascular:      Rate and Rhythm: Normal rate and regular rhythm.   Pulmonary:      Effort: Pulmonary effort is normal.      Breath sounds: Normal breath sounds.   Chest:       Abdominal:      General: Bowel sounds are normal.      Palpations: Abdomen is soft. There is no mass.      Tenderness: There is no abdominal tenderness.   Musculoskeletal:      Right lower leg: No edema.      Left lower leg: No edema.   Lymphadenopathy:      Cervical: No cervical adenopathy.   Neurological:      General: No focal deficit present.      Mental Status: She is alert.   Psychiatric:         Mood and Affect: Mood normal.         Behavior: Behavior normal.       Results from last " visit:  Office Visit on 05/05/2025   Component Date Value Ref Range Status    Sodium 05/05/2025 138  135 - 145 mmol/L Final    Potassium 05/05/2025 4.5  3.4 - 5.3 mmol/L Final    Carbon Dioxide (CO2) 05/05/2025 24  22 - 29 mmol/L Final    Anion Gap 05/05/2025 10  7 - 15 mmol/L Final    Urea Nitrogen 05/05/2025 7.1  6.0 - 20.0 mg/dL Final    Creatinine 05/05/2025 0.55  0.51 - 0.95 mg/dL Final    GFR Estimate 05/05/2025 >90  >60 mL/min/1.73m2 Final    eGFR calculated using 2021 CKD-EPI equation.    Calcium 05/05/2025 9.1  8.8 - 10.4 mg/dL Final    Chloride 05/05/2025 104  98 - 107 mmol/L Final    Glucose 05/05/2025 114 (H)  70 - 99 mg/dL Final    Alkaline Phosphatase 05/05/2025 62  40 - 150 U/L Final    AST 05/05/2025 23  0 - 45 U/L Final    ALT 05/05/2025 19  0 - 50 U/L Final    Protein Total 05/05/2025 7.5  6.4 - 8.3 g/dL Final    Albumin 05/05/2025 4.1  3.5 - 5.2 g/dL Final    Bilirubin Total 05/05/2025 0.3  <=1.2 mg/dL Final    Patient Fasting > 8hrs? 05/05/2025 Unknown   Final    Cholesterol 05/05/2025 201 (H)  <200 mg/dL Final    Triglycerides 05/05/2025 61  <150 mg/dL Final    Direct Measure HDL 05/05/2025 56  >=50 mg/dL Final    LDL Cholesterol Calculated 05/05/2025 133 (H)  <100 mg/dL Final    Non HDL Cholesterol 05/05/2025 145 (H)  <130 mg/dL Final    Patient Fasting > 8hrs? 05/05/2025 Unknown   Final    Vitamin D, Total (25-Hydroxy) 05/05/2025 24  20 - 50 ng/mL Final    optimum levels    TSH 05/05/2025 2.30  0.30 - 4.20 uIU/mL Final    Estimated Average Glucose 05/05/2025 123 (H)  <117 mg/dL Final    Hemoglobin A1C 05/05/2025 5.9 (H)  0.0 - 5.6 % Final    Normal <5.7%   Prediabetes 5.7-6.4%    Diabetes 6.5% or higher     Note: Adopted from ADA consensus guidelines.    WBC Count 05/05/2025 5.2  4.0 - 11.0 10e3/uL Final    RBC Count 05/05/2025 4.41  3.80 - 5.20 10e6/uL Final    Hemoglobin 05/05/2025 12.8  11.7 - 15.7 g/dL Final    Hematocrit 05/05/2025 39.5  35.0 - 47.0 % Final    MCV 05/05/2025 90  78 - 100  fL Final    MCH 05/05/2025 29.0  26.5 - 33.0 pg Final    MCHC 05/05/2025 32.4  31.5 - 36.5 g/dL Final    RDW 05/05/2025 14.2  10.0 - 15.0 % Final    Platelet Count 05/05/2025 154  150 - 450 10e3/uL Final    Hold Specimen 05/05/2025 JIC   Final    Iron 05/05/2025 46  37 - 145 ug/dL Final    Iron Binding Capacity 05/05/2025 301  240 - 430 ug/dL Final    Iron Sat Index 05/05/2025 15  15 - 46 % Final    Ferritin 05/05/2025 27  11 - 328 ng/mL Final        Signed Electronically by: Padmini Lee MD

## 2025-06-09 PROBLEM — N95.1 PERIMENOPAUSE: Status: ACTIVE | Noted: 2025-06-09

## 2025-06-09 PROBLEM — D50.0 IRON DEFICIENCY ANEMIA DUE TO CHRONIC BLOOD LOSS: Chronic | Status: RESOLVED | Noted: 2021-03-21 | Resolved: 2025-06-09

## 2025-06-09 ASSESSMENT — ENCOUNTER SYMPTOMS
NEUROLOGICAL NEGATIVE: 1
RESPIRATORY NEGATIVE: 1
PSYCHIATRIC NEGATIVE: 1
CARDIOVASCULAR NEGATIVE: 1
GASTROINTESTINAL NEGATIVE: 1
ENDOCRINE NEGATIVE: 1
MUSCULOSKELETAL NEGATIVE: 1
CONSTITUTIONAL NEGATIVE: 1

## 2025-06-09 NOTE — PATIENT INSTRUCTIONS
Patient Education   Preventive Care Advice   This is general advice given by our system to help you stay healthy. However, your care team may have specific advice just for you. Please talk to your care team about your preventive care needs.  Nutrition  Eat 5 or more servings of fruits and vegetables each day.  Try wheat bread, brown rice and whole grain pasta (instead of white bread, rice, and pasta).  Get enough calcium and vitamin D. Check the label on foods and aim for 100% of the RDA (recommended daily allowance).  Lifestyle  Exercise at least 150 minutes each week  (30 minutes a day, 5 days a week).  Do muscle strengthening activities 2 days a week. These help control your weight and prevent disease.  No smoking.  Wear sunscreen to prevent skin cancer.  Have a dental exam and cleaning every 6 months.  Yearly exams  See your health care team every year to talk about:  Any changes in your health.  Any medicines your care team has prescribed.  Preventive care, family planning, and ways to prevent chronic diseases.  Shots (vaccines)   HPV shots (up to age 26), if you've never had them before.  Hepatitis B shots (up to age 59), if you've never had them before.  COVID-19 shot: Get this shot when it's due.  Flu shot: Get a flu shot every year.  Tetanus shot: Get a tetanus shot every 10 years.  Pneumococcal, hepatitis A, and RSV shots: Ask your care team if you need these based on your risk.  Shingles shot (for age 50 and up)  General health tests  Diabetes screening:  Starting at age 35, Get screened for diabetes at least every 3 years.  If you are younger than age 35, ask your care team if you should be screened for diabetes.  Cholesterol test: At age 39, start having a cholesterol test every 5 years, or more often if advised.  Bone density scan (DEXA): At age 50, ask your care team if you should have this scan for osteoporosis (brittle bones).  Hepatitis C: Get tested at least once in your life.  STIs (sexually  transmitted infections)  Before age 24: Ask your care team if you should be screened for STIs.  After age 24: Get screened for STIs if you're at risk. You are at risk for STIs (including HIV) if:  You are sexually active with more than one person.  You don't use condoms every time.  You or a partner was diagnosed with a sexually transmitted infection.  If you are at risk for HIV, ask about PrEP medicine to prevent HIV.  Get tested for HIV at least once in your life, whether you are at risk for HIV or not.  Cancer screening tests  Cervical cancer screening: If you have a cervix, begin getting regular cervical cancer screening tests starting at age 21.  Breast cancer scan (mammogram): If you've ever had breasts, begin having regular mammograms starting at age 40. This is a scan to check for breast cancer.  Colon cancer screening: It is important to start screening for colon cancer at age 45.  Have a colonoscopy test every 10 years (or more often if you're at risk) Or, ask your provider about stool tests like a FIT test every year or Cologuard test every 3 years.  To learn more about your testing options, visit:   .  For help making a decision, visit:   https://bit.ly/ko09693.    For informational purposes only. Not to replace the advice of your health care provider. Copyright   2023 [facility]. All rights reserved. Clinically reviewed by the [facility] Transitions Program. [employer] [id number] - REV 01/24.